# Patient Record
Sex: MALE | Race: WHITE
[De-identification: names, ages, dates, MRNs, and addresses within clinical notes are randomized per-mention and may not be internally consistent; named-entity substitution may affect disease eponyms.]

---

## 2018-01-18 ENCOUNTER — HOSPITAL ENCOUNTER (INPATIENT)
Dept: HOSPITAL 62 - ER | Age: 53
LOS: 2 days | Discharge: ANOTHER HOSPITAL | DRG: 208 | End: 2018-01-20
Attending: SURGERY | Admitting: SURGERY
Payer: COMMERCIAL

## 2018-01-18 DIAGNOSIS — J18.9: ICD-10-CM

## 2018-01-18 DIAGNOSIS — Z78.1: ICD-10-CM

## 2018-01-18 DIAGNOSIS — E78.5: ICD-10-CM

## 2018-01-18 DIAGNOSIS — S22.41XA: Primary | ICD-10-CM
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
## 2018-01-18 DIAGNOSIS — F32.9: ICD-10-CM

## 2018-01-18 DIAGNOSIS — F17.210: ICD-10-CM

## 2018-01-18 DIAGNOSIS — I10: ICD-10-CM

## 2018-01-18 DIAGNOSIS — J96.01: ICD-10-CM

## 2018-01-18 DIAGNOSIS — R09.02: ICD-10-CM

## 2018-01-18 DIAGNOSIS — J98.11: ICD-10-CM

## 2018-01-18 DIAGNOSIS — Z88.8: ICD-10-CM

## 2018-01-18 DIAGNOSIS — M25.521: ICD-10-CM

## 2018-01-18 DIAGNOSIS — J43.9: ICD-10-CM
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
## 2018-01-18 LAB
ABSOLUTE LYMPHOCYTES# (MANUAL): 1.1 10^3/UL (ref 0.5–4.7)
ABSOLUTE MONOCYTES # (MANUAL): 1.7 10^3/UL (ref 0.1–1.4)
ABSOLUTE NEUTROPHILS# (MANUAL): 18.6 10^3/UL (ref 1.7–8.2)
ADD MANUAL DIFF: YES
ALBUMIN SERPL-MCNC: 4.7 G/DL (ref 3.5–5)
ALP SERPL-CCNC: 108 U/L (ref 38–126)
ALT SERPL-CCNC: 49 U/L (ref 21–72)
ANION GAP SERPL CALC-SCNC: 14 MMOL/L (ref 5–19)
AST SERPL-CCNC: 46 U/L (ref 17–59)
BASOPHILS NFR BLD MANUAL: 0 % (ref 0–2)
BILIRUB DIRECT SERPL-MCNC: 0.2 MG/DL (ref 0–0.4)
BILIRUB SERPL-MCNC: 0.4 MG/DL (ref 0.2–1.3)
BUN SERPL-MCNC: 23 MG/DL (ref 7–20)
CALCIUM: 9.9 MG/DL (ref 8.4–10.2)
CHLORIDE SERPL-SCNC: 105 MMOL/L (ref 98–107)
CO2 SERPL-SCNC: 23 MMOL/L (ref 22–30)
EOSINOPHIL NFR BLD MANUAL: 0 % (ref 0–6)
ERYTHROCYTE [DISTWIDTH] IN BLOOD BY AUTOMATED COUNT: 13.1 % (ref 11.5–14)
GLUCOSE SERPL-MCNC: 128 MG/DL (ref 75–110)
HCT VFR BLD CALC: 53.5 % (ref 37.9–51)
HGB BLD-MCNC: 18.2 G/DL (ref 13.5–17)
MCH RBC QN AUTO: 33.5 PG (ref 27–33.4)
MCHC RBC AUTO-ENTMCNC: 34 G/DL (ref 32–36)
MCV RBC AUTO: 99 FL (ref 80–97)
MONOCYTES % (MANUAL): 8 % (ref 3–13)
PLATELET # BLD: 280 10^3/UL (ref 150–450)
PLATELET COMMENT: ADEQUATE
PLATELET GIANT: PRESENT
PLATELET LARGE: PRESENT
POTASSIUM SERPL-SCNC: 4.3 MMOL/L (ref 3.6–5)
PROT SERPL-MCNC: 7.8 G/DL (ref 6.3–8.2)
RBC # BLD AUTO: 5.44 10^6/UL (ref 4.35–5.55)
RBC MORPH BLD: (no result)
SEGMENTED NEUTROPHILS % (MAN): 87 % (ref 42–78)
SODIUM SERPL-SCNC: 142.1 MMOL/L (ref 137–145)
TOTAL CELLS COUNTED BLD: 100
TOXIC GRANULES BLD QL SMEAR: (no result)
VARIANT LYMPHS NFR BLD MANUAL: 4 % (ref 13–45)
WBC # BLD AUTO: 21.4 10^3/UL (ref 4–10.5)

## 2018-01-18 PROCEDURE — 93010 ELECTROCARDIOGRAM REPORT: CPT

## 2018-01-18 PROCEDURE — 71045 X-RAY EXAM CHEST 1 VIEW: CPT

## 2018-01-18 PROCEDURE — 94660 CPAP INITIATION&MGMT: CPT

## 2018-01-18 PROCEDURE — 31500 INSERT EMERGENCY AIRWAY: CPT

## 2018-01-18 PROCEDURE — 82803 BLOOD GASES ANY COMBINATION: CPT

## 2018-01-18 PROCEDURE — 96375 TX/PRO/DX INJ NEW DRUG ADDON: CPT

## 2018-01-18 PROCEDURE — 84478 ASSAY OF TRIGLYCERIDES: CPT

## 2018-01-18 PROCEDURE — 71260 CT THORAX DX C+: CPT

## 2018-01-18 PROCEDURE — 85027 COMPLETE CBC AUTOMATED: CPT

## 2018-01-18 PROCEDURE — 36600 WITHDRAWAL OF ARTERIAL BLOOD: CPT

## 2018-01-18 PROCEDURE — 85025 COMPLETE CBC W/AUTO DIFF WBC: CPT

## 2018-01-18 PROCEDURE — 87040 BLOOD CULTURE FOR BACTERIA: CPT

## 2018-01-18 PROCEDURE — 74181 MRI ABDOMEN W/O CONTRAST: CPT

## 2018-01-18 PROCEDURE — 36415 COLL VENOUS BLD VENIPUNCTURE: CPT

## 2018-01-18 PROCEDURE — 80053 COMPREHEN METABOLIC PANEL: CPT

## 2018-01-18 PROCEDURE — 96374 THER/PROPH/DIAG INJ IV PUSH: CPT

## 2018-01-18 PROCEDURE — 80048 BASIC METABOLIC PNL TOTAL CA: CPT

## 2018-01-18 PROCEDURE — 99285 EMERGENCY DEPT VISIT HI MDM: CPT

## 2018-01-18 PROCEDURE — 94002 VENT MGMT INPAT INIT DAY: CPT

## 2018-01-18 PROCEDURE — 93005 ELECTROCARDIOGRAM TRACING: CPT

## 2018-01-18 PROCEDURE — 83880 ASSAY OF NATRIURETIC PEPTIDE: CPT

## 2018-01-18 NOTE — ER DOCUMENT REPORT
ED Medical Screen (RME)





- General


Chief Complaint: Back Pain


Stated Complaint: RIGHT SIDE PAIN


Time Seen by Provider: 01/18/18 19:49


Mode of Arrival: Medic


Information source: Patient


Notes: 





52-year-old male presents after mechanical fall on ice landing on his back 

hitting his right posterior ribs.  Patient denies any shortness of breath notes 

pain with movement








I have greeted and performed a rapid initial assessment of this patient.  A 

comprehensive ED assessment and evaluation of the patient, analysis of test 

results and completion of the medical decision making process will be conducted 

by additional ED providers.





PHYSICAL EXAMINATION:





GENERAL: Well-appearing, well-nourished and in no acute distress.





HEAD: Atraumatic, normocephalic.





EYES: Pupils equal round extraocular movements intact,  conjunctiva are normal.





ENT: Nares patent





NECK: Normal range of motion





LUNGS: No respiratory distress





Musculoskeletal: Normal range of motion





NEUROLOGICAL:  Normal speech, normal gait. 





PSYCH: Normal mood, normal affect.





SKIN: Warm, Dry, normal turgor, no rashes or lesions noted.


TRAVEL OUTSIDE OF THE [place] IN LAST 30 DAYS: No





- Related Data


Allergies/Adverse Reactions: 


 





chlorpheniramine [From Actifed Cold-Allergy] Allergy (Verified 01/18/18 19:43)


 


phenylephrine [From Actifed Cold-Allergy] Allergy (Verified 01/18/18 19:43)


 


pseudoephedrine [From Actifed Cold-Allergy] Allergy (Verified 01/18/18 19:43)


 


triprolidine [From Actifed Cold-Allergy] Allergy (Verified 01/18/18 19:43)


 











Past Medical History





- Social History


Chew tobacco use (# tins/day): No


Frequency of alcohol use: Social


Drug Abuse: None





- Past Medical History


Cardiac Medical History: Reports: Hx Hypercholesterolemia, Hx Hypertension


Pulmonary Medical History: Reports: Hx COPD


Renal/ Medical History: Denies: Hx Peritoneal Dialysis


Psychiatric Medical History: Reports: Hx Depression





Physical Exam





- Vital signs


Vitals: 





 











Temp Pulse Resp BP Pulse Ox


 


 98.2 F   89   22 H  136/83 H  92 


 


 01/18/18 18:40  01/18/18 18:40  01/18/18 18:40  01/18/18 18:40  01/18/18 18:40














Course





- Vital Signs


Vital signs: 





 











Temp Pulse Resp BP Pulse Ox


 


 98.2 F   89   22 H  136/83 H  92 


 


 01/18/18 18:40  01/18/18 18:40  01/18/18 18:40  01/18/18 18:40  01/18/18 18:40

## 2018-01-18 NOTE — RADIOLOGY REPORT (SQ)
EXAM DESCRIPTION:

CT CHEST WITH



CLINICAL HISTORY:

fall on ice, right sided post rib pain



COMPARISON:

None.



TECHNIQUE:

Axial CT images of the chest were acquired after the

administration of intravenous contrast. Coronal and sagittal

reconstructions were obtained.  



This exam was performed according to our departmental

dose-optimization program which includes use of Automated

Exposure Control, adjustment of the mA and/or kV according to

patient size and/or use of iterative reconstruction technique.





FINDINGS:



Neck base: Unremarkable.

Mediastinum: Unremarkable.

Lymph Nodes: No lymphadenopathy.



Heart and pericardium: Coronary artery calcifications.

Aorta: No aneurysm.

Pulmonary Artery: Unremarkable.



Central Airways: Patent.

Pleura: No pleural effusion.

Lungs: Large left upper lobe bulla favored over pneumothorax.

Severe emphysema.

Right lower lobe partial consolidation. Left lower lobe

atelectasis.

No suspicious pulmonary nodule or mass.



Upper abdomen: Subcentimeter hypoattenuating lesion within the

left hepatic lobe, likely a small cyst or hemangioma. 15 mm right

adrenal nodule measuring 24 Hounsfield units. Nodular thickening

of the left adrenal.

Bones and soft tissues: Mildly displaced fractures of the right

eighth, ninth, and 10th ribs. Nondisplaced fracture of the right

11th rib.





IMPRESSION:



Right eighth, ninth, 10th, and 11th rib fractures.



Prominent left upper lobe bullous emphysema. Cystic spaces in the

left upper thorax are favored to represent bulla over

pneumothorax given lack of left-sided rib fractures.



Bilateral lower lobe consolidation, favored to represent

atelectasis over pneumonia given rib fractures.



Bilateral adrenal nodules. These likely represent adrenal

adenomas however may be confirmed with dedicated adrenal CT or

MRI.

## 2018-01-18 NOTE — ER DOCUMENT REPORT
ED General





- General


Chief Complaint: Back Pain


Stated Complaint: RIGHT SIDE PAIN


Time Seen by Provider: 01/18/18 19:49


Mode of Arrival: Medic


Notes: 





Patient is a 52 year old male with a past medical history of hypertension and 

COPD, active smoker, who presents after slipping on ice landing onto his right 

side.  He states that since that time he has had a severe, constant, aching 

pain to his right low flank.  He states it is worsened by movement and 

breathing.  He has received morphine and states that it did not provide any 

meaningful relief of his pain.  He has no history of similar injury in the 

past.  He does not have a baseline oxygen dependence.  He denies any additional 

injury to any other location.  He has not seen his primary care doctor 

regarding today's concerns.


TRAVEL OUTSIDE OF THE [place] IN LAST 30 DAYS: No





- Related Data


Allergies/Adverse Reactions: 


 





chlorpheniramine [From Actifed Cold-Allergy] Allergy (Verified 01/18/18 19:43)


 


phenylephrine [From Actifed Cold-Allergy] Allergy (Verified 01/18/18 19:43)


 


pseudoephedrine [From Actifed Cold-Allergy] Allergy (Verified 01/18/18 19:43)


 


triprolidine [From Actifed Cold-Allergy] Allergy (Verified 01/18/18 19:43)


 











Past Medical History





- General


Information source: Patient





- Social History


Smoking Status: Current Every Day Smoker


Chew tobacco use (# tins/day): No


Frequency of alcohol use: Social


Drug Abuse: None


Lives with: Spouse/Significant other


Family History: Reviewed & Not Pertinent


Patient has suicidal ideation: No


Patient has homicidal ideation: No





- Past Medical History


Cardiac Medical History: Reports: Hx Hypercholesterolemia, Hx Hypertension


Pulmonary Medical History: Reports: Hx COPD


Renal/ Medical History: Denies: Hx Peritoneal Dialysis


Psychiatric Medical History: Reports: Hx Depression





Review of Systems





- Review of Systems


Notes: 





Constitutional: Negative for fever.


Eyes: Negative for visual changes.


ENT: Negative for facial injury


Cardiovascular: Negative for chest injury.


Respiratory: Negative for shortness of breath.


Gastrointestinal: Negative for abdominal  injury.


Genitourinary: Negative for genital injury


Musculoskeletal: Postive for Right lower rib pain


Skin: Negative for laceration/abrasions.


Neurological: Negative for head injury.





Physical Exam





- Vital signs


Vitals: 


 











Temp Pulse Resp BP Pulse Ox


 


 98.2 F   89   22 H  136/83 H  92 


 


 01/18/18 18:40  01/18/18 18:40  01/18/18 18:40  01/18/18 18:40  01/18/18 18:40











Interpretation: Hypoxic, Tachypneic


Notes: 





PHYSICAL EXAMINATION:





GENERAL: Appears to be in significant pain.





HEAD: Atraumatic, normocephalic.





EYES: Pupils equal round and reactive to light, extraocular movements intact, 

sclera anicteric, conjunctiva are normal.





ENT: nares patent, no oral pharyngeal trauma.  No hemotympanum, no Sams's sign

, no raccoon eyes.





NECK: No midline cervical spine tenderness.  Patient able to move their head to 

45 bilaterally without any discomfort. 





LUNGS: Breath sounds clear to auscultation bilaterally and equal.  No wheezes 

rales or rhonchi.





HEART: Regular rate and rhythm without murmurs.





CHEST WALL: No ecchymosis over the chest wall.  Severe pain on palpation of the 

right lower posterior ribs.





ABDOMEN: Soft, nontender, normoactive bowel sounds.  No guarding, no rebound.  

No abdominal bruising





EXTREMITIES: Normal range of motion, no pitting or edema.  No long bone 

deformities.





BACK: No midline spinal tenderness, step-offs, or deformities.





NEUROLOGICAL: Face symmetric.  Tongue protrudes midline.  Extraocular motions 

intact.  Pupils are 2 mm and equally reactive.  Normal speech, normal gait.  5 

out of 5 strength in both the distal and proximal upper and lower extremities 

bilaterally.  Sensation is grossly intact throughout.  Finger to nose testing 

normal.  Pronator drift normal.





PSYCH: Normal mood, normal affect.





SKIN: Warm, Dry, normal turgor, no rashes or lesions noted.





Course





- Re-evaluation


Re-evalutation: 





01/18/18 21:54


Rib pain Patient presents after falling onto their ribs, complaining of focal 

pain to the affected area.  Patient appears to be in extreme pain, and had mild 

hypoxia at time of presentation at 92% on room air.  He is a smoker.  Patient 

denies any additional injuries.  He has focal pain to the ninth through 12th 

posterior lower ribs.  Given his degree of pain hypoxemia, will proceed with CT 

of the chest to clarify.  Will provide aggressive pain control and see if I can 

allow the patient to breathe in an acceptable way but anticipate he may require 

hospitalization


01/18/18 23:59


Patient continues to saturate 87-88% on room air.  He has a multitude rib 

fractures on the right side.  The CT is notable for extensive blebs in the left 

lung in particular but no evidence of a pneumothorax.  Given patient's 

hypoxemia and ongoing pain control issues he will require hospitalization.  I 

have increased the dosing of hydromorphone to try to improve his pain control.  

Will discuss with the surgeon on-call for trauma admission.


01/19/18 01:17


I have discussed with Dr. Rafael Sutherland who has agreed to hospitalize the 

patient given his oxygen dependence and injuries.





- Vital Signs


Vital signs: 


 











Temp Pulse Resp BP Pulse Ox


 


 98.2 F   78   16   115/74   91 L


 


 01/18/18 18:40  01/18/18 22:11  01/18/18 22:49  01/18/18 22:11  01/18/18 22:49














- Laboratory


Result Diagrams: 


 01/18/18 20:29





 01/18/18 21:27


Laboratory results interpreted by me: 


 











  01/18/18 01/18/18





  20:29 21:27


 


WBC  21.4 H 


 


Hgb  18.2 H 


 


Hct  53.5 H 


 


MCV  99 H 


 


MCH  33.5 H 


 


Seg Neuts % (Manual)  87 H 


 


Lymphocytes % (Manual)  4 L 


 


Abs Neuts (Manual)  18.6 H 


 


Abs Monocytes (Manual)  1.7 H 


 


BUN   23 H


 


Glucose   128 H














- Diagnostic Test


Radiology reviewed: Image reviewed, Reports reviewed


Radiology results interpreted by me: 





01/19/18 00:00


CT chest: Multiple rib fractures on the right side, extensive blebs on the left 

lung





Discharge





- Discharge


Clinical Impression: 


 Multiple fractures of ribs, right side, initial encounter for closed fracture, 

Hypoxemia





Fall


Qualifiers:


 Encounter type: initial encounter Qualified Code(s): W19.XXXA - Unspecified 

fall, initial encounter





Condition: Fair


Disposition: ADMITTED AS INPATIENT


Admitting Provider: Surgicalist - [facility]


Unit Admitted: Surgical Floor

## 2018-01-19 RX ADMIN — HYDROMORPHONE HYDROCHLORIDE PRN MG: 2 INJECTION INTRAMUSCULAR; INTRAVENOUS; SUBCUTANEOUS at 20:06

## 2018-01-19 RX ADMIN — HYDROMORPHONE HYDROCHLORIDE PRN MG: 2 INJECTION INTRAMUSCULAR; INTRAVENOUS; SUBCUTANEOUS at 08:04

## 2018-01-19 RX ADMIN — ENOXAPARIN SODIUM SCH MG: 40 INJECTION SUBCUTANEOUS at 11:19

## 2018-01-19 RX ADMIN — HYDROMORPHONE HYDROCHLORIDE PRN MG: 2 INJECTION INTRAMUSCULAR; INTRAVENOUS; SUBCUTANEOUS at 14:24

## 2018-01-19 RX ADMIN — Medication SCH ML: at 13:09

## 2018-01-19 RX ADMIN — Medication SCH ML: at 21:38

## 2018-01-19 RX ADMIN — HYDROMORPHONE HYDROCHLORIDE PRN MG: 2 INJECTION INTRAMUSCULAR; INTRAVENOUS; SUBCUTANEOUS at 17:15

## 2018-01-19 RX ADMIN — FAMOTIDINE SCH MG: 20 TABLET, FILM COATED ORAL at 21:37

## 2018-01-19 RX ADMIN — HYDROMORPHONE HYDROCHLORIDE PRN MG: 2 INJECTION INTRAMUSCULAR; INTRAVENOUS; SUBCUTANEOUS at 12:03

## 2018-01-19 RX ADMIN — LIDOCAINE SCH PATCH: 50 PATCH CUTANEOUS at 11:18

## 2018-01-19 RX ADMIN — HYDROMORPHONE HYDROCHLORIDE PRN MG: 2 INJECTION INTRAMUSCULAR; INTRAVENOUS; SUBCUTANEOUS at 23:19

## 2018-01-19 RX ADMIN — Medication SCH ML: at 10:53

## 2018-01-19 RX ADMIN — FAMOTIDINE SCH MG: 20 TABLET, FILM COATED ORAL at 11:17

## 2018-01-19 RX ADMIN — HYDROMORPHONE HYDROCHLORIDE PRN MG: 2 INJECTION INTRAMUSCULAR; INTRAVENOUS; SUBCUTANEOUS at 03:35

## 2018-01-19 RX ADMIN — PIPERACILLIN AND TAZOBACTAM SCH GM: 3; .375 INJECTION, POWDER, LYOPHILIZED, FOR SOLUTION INTRAVENOUS; PARENTERAL at 09:25

## 2018-01-19 RX ADMIN — FLUOXETINE SCH MG: 20 CAPSULE ORAL at 21:37

## 2018-01-19 RX ADMIN — PIPERACILLIN AND TAZOBACTAM SCH GM: 3; .375 INJECTION, POWDER, LYOPHILIZED, FOR SOLUTION INTRAVENOUS; PARENTERAL at 20:07

## 2018-01-19 RX ADMIN — BUDESONIDE AND FORMOTEROL FUMARATE DIHYDRATE SCH PUFF: 80; 4.5 AEROSOL RESPIRATORY (INHALATION) at 23:18

## 2018-01-19 RX ADMIN — PIPERACILLIN AND TAZOBACTAM SCH GM: 3; .375 INJECTION, POWDER, LYOPHILIZED, FOR SOLUTION INTRAVENOUS; PARENTERAL at 15:02

## 2018-01-19 NOTE — RADIOLOGY REPORT (SQ)
EXAM DESCRIPTION:  MRI ABDOMEN WITHOUT



COMPLETED DATE/TIME:  1/19/2018 9:07 am



REASON FOR STUDY:  Adrenal masses



COMPARISON:  CT chest 1/18/2018



TECHNIQUE:  Noncontrast imaging with attention to the adrenal glands, including in and out of phase T
1 sequences.



LIMITATIONS:  None.



FINDINGS:  ADRENAL GLANDS: On the right side, a 1.5 cm adenoma is present.  On the left side, a 2.5 x
 2 cm adenoma is present.  These findings correlate with the CT 1/18/2018.  No further specific imagi
ng follow-up imaging is required

GALLBLADDER: No masses. No stones. No gallbladder wall thickening or pericholecystic fluid.

LIVER AND BILIARY STRUCTURES: Normal. No ductal dilatation.

SPLEEN: Normal.

PANCREAS: Normal. Peripancreatic tissues normal.

PERITONEUM: No ascites, gross adenopathy or implants.

OTHER: Kidneys are unremarkable.  No upper abdominal free fluid.

There are right lower posterolateral rib fractures with a small amount of adjacent chest wall fluid. 
 No pleural effusion.  There is dense consolidation in the right lower lobe and patchy consolidation 
at the left base, atelectasis versus pneumonia.



IMPRESSION:  Bilateral adrenal adenomas

Right lower posterior rib fractures.  No right kidney or liver laceration

Dense consolidation right lower lobe, patchy consolidation left base, atelectasis versus pneumonia



TECHNICAL DOCUMENTATION:  JOB ID: [id number]

 2011 [employer]- All Rights Reserved

## 2018-01-19 NOTE — PDOC H&P
History of Present Illness


Patient complains of: Right-sided posterior chest pain


History of Present Illness: 


LUDWIN SWENSON is a 52 year old male status post fall on the ice landing on 

his right chest with associated pain.  Patient was noted with decreased oxygen 

saturations requiring supplemental oxygen.  Patient denies any other sites of 

pain other than very mild soreness of the right elbow.  He denies any loss of 

consciousness.  He does not hurt in his hip.  No abdominal pain.  Patient does 

drink almost every day and has had some alcohol today.








Past Medical History


Cardiac Medical History: Reports: Hyperlipidema, Hypertension


Pulmonary Medical History: Reports: Chronic Obstructive Pulmonary Disease (COPD)


Psychiatric Medical History: Reports: Depression





Past Surgical History


Past Surgical History: Reports: Other - Left orchiectomy in the remote past.





Social History


Lives with: Spouse/Significant other


Smoking Status: Current Every Day Smoker





Family History


Family History: Reviewed & Not Pertinent


Parental Family History Reviewed: No


Children Family History Reviewed: No


Sibling(s) Family History Reviewed.: No





Medication/Allergy


Allergies/Adverse Reactions: 


 





chlorpheniramine [From Actifed Cold-Allergy] Allergy (Verified 01/18/18 19:43)


 


phenylephrine [From Actifed Cold-Allergy] Allergy (Verified 01/18/18 19:43)


 


pseudoephedrine [From Actifed Cold-Allergy] Allergy (Verified 01/18/18 19:43)


 


triprolidine [From Actifed Cold-Allergy] Allergy (Verified 01/18/18 19:43)


 











Physical Exam


Vital Signs: 


 











Temp Pulse Resp BP Pulse Ox


 


 98.2 F   78   16   115/74   91 L


 


 01/18/18 18:40  01/18/18 22:11  01/18/18 22:49  01/18/18 22:11  01/18/18 22:49








 Intake & Output











 01/17/18 01/18/18 01/19/18





 06:59 06:59 06:59


 


Weight   103.1 kg











General appearance: PRESENT: no acute distress, cooperative


Eye exam: PRESENT: conjunctiva pink


Neck exam: PRESENT: other - Supple with no vertebral tenderness.


Respiratory exam: PRESENT: decreased breath sounds - Tenderness along the right 

posterior chest but no crepitus and no bruising.


Cardiovascular exam: PRESENT: RRR


Pulses: PRESENT: normal radial pulses


GI/Abdominal exam: PRESENT: other - Soft, nondistended, nontender to palpation.


Extremities exam: PRESENT: other - Full range of motion without pain with range 

of motion.  Very minimal right elbow tenderness with no swelling and no 

bruising.  Right arm neurovascularly intact distally.


Neurological exam: PRESENT: alert, awake


Psychiatric exam: PRESENT: appropriate affect


Skin exam: PRESENT: warm





Results


Laboratory Results: 


 





 01/18/18 20:29 





 01/18/18 21:27 





 











  01/18/18 01/18/18 01/18/18





  20:29 20:29 21:27


 


WBC  21.4 H  


 


RBC  5.44  


 


Hgb  18.2 H  


 


Hct  53.5 H  


 


MCV  99 H  


 


MCH  33.5 H  


 


MCHC  34.0  


 


RDW  13.1  


 


Plt Count  280  


 


Seg Neutrophils %  Not Reportable  


 


Lymphocytes %  Not Reportable  


 


Monocytes %  Not Reportable  


 


Eosinophils %  Not Reportable  


 


Basophils %  Not Reportable  


 


Absolute Neutrophils  Not Reportable  


 


Absolute Lymphocytes  Not Reportable  


 


Absolute Monocytes  Not Reportable  


 


Absolute Eosinophils  Not Reportable  


 


Absolute Basophils  Not Reportable  


 


Sodium   Cancelled  142.1


 


Potassium   Cancelled  4.3


 


Chloride   Cancelled  105


 


Carbon Dioxide   Cancelled  23


 


Anion Gap   Cancelled  14


 


BUN   Cancelled  23 H


 


Creatinine   Cancelled  1.02


 


Est GFR ( Amer)   Cancelled  > 60


 


Est GFR (Non-Af Amer)   Cancelled  > 60


 


Glucose   Cancelled  128 H


 


Calcium   Cancelled  9.9


 


Total Bilirubin   Cancelled  0.4


 


AST   Cancelled  46


 


ALT   Cancelled  49


 


Alkaline Phosphatase   Cancelled  108


 


Total Protein   Cancelled  7.8


 


Albumin   Cancelled  4.7











Impressions: 


 





Chest CT  01/18/18 19:53


IMPRESSION:


 


Right eighth, ninth, 10th, and 11th rib fractures.


 


Prominent left upper lobe bullous emphysema. Cystic spaces in the


left upper thorax are favored to represent bulla over


pneumothorax given lack of left-sided rib fractures.


 


Bilateral lower lobe consolidation, favored to represent


atelectasis over pneumonia given rib fractures.


 


Bilateral adrenal nodules. These likely represent adrenal


adenomas however may be confirmed with dedicated adrenal CT or


MRI.


 


 














Assessment & Plan





- Diagnosis


(1) Multiple fractures of ribs, right side, initial encounter for closed 

fracture


Is this a current diagnosis for this admission?: Yes   


Plan: 


Status post fall.  Multiple rib fractures with atelectasis.  Will admit for 

pain control and pulmonary toilet.  Will consult hospitalist in the morning to 

assist us with his pulmonary management and also for possible alcohol 

withdrawal down the road.








(2) Adrenal mass


Is this a current diagnosis for this admission?: Yes   


Plan: 


Incidental finding noted on CT scan.  Will obtain a MRI while he is in the 

hospital to evaluate these masses.

## 2018-01-20 VITALS — SYSTOLIC BLOOD PRESSURE: 103 MMHG | DIASTOLIC BLOOD PRESSURE: 64 MMHG

## 2018-01-20 LAB
ANION GAP SERPL CALC-SCNC: 15 MMOL/L (ref 5–19)
ARTERIAL BLOOD FIO2: (no result)
ARTERIAL BLOOD FIO2: 100
ARTERIAL BLOOD H2CO3: 1.31 MMOL/L (ref 1.05–1.35)
ARTERIAL BLOOD H2CO3: 1.34 MMOL/L (ref 1.05–1.35)
ARTERIAL BLOOD H2CO3: 1.39 MMOL/L (ref 1.05–1.35)
ARTERIAL BLOOD H2CO3: 1.97 MMOL/L (ref 1.05–1.35)
ARTERIAL BLOOD HCO3: 23.4 MMOL/L (ref 20–26)
ARTERIAL BLOOD HCO3: 26.2 MMOL/L (ref 20–26)
ARTERIAL BLOOD HCO3: 26.8 MMOL/L (ref 20–26)
ARTERIAL BLOOD HCO3: 27.1 MMOL/L (ref 20–26)
ARTERIAL BLOOD PCO2: 43.5 MMHG (ref 35–45)
ARTERIAL BLOOD PCO2: 44.5 MMHG (ref 35–45)
ARTERIAL BLOOD PCO2: 46.1 MMHG (ref 35–45)
ARTERIAL BLOOD PCO2: 65.5 MMHG (ref 35–45)
ARTERIAL BLOOD PH: 7.24 (ref 7.35–7.45)
ARTERIAL BLOOD PH: 7.35 (ref 7.35–7.45)
ARTERIAL BLOOD PH: 7.37 (ref 7.35–7.45)
ARTERIAL BLOOD PH: 7.4 (ref 7.35–7.45)
ARTERIAL BLOOD PO2: 55.3 MMHG (ref 80–100)
ARTERIAL BLOOD PO2: 56.8 MMHG (ref 80–100)
ARTERIAL BLOOD PO2: 61.1 MMHG (ref 80–100)
ARTERIAL BLOOD PO2: 63 MMHG (ref 80–100)
ARTERIAL BLOOD TOTAL CO2: 24.7 MMOL/L (ref 23–27)
ARTERIAL BLOOD TOTAL CO2: 27.6 MMOL/L (ref 23–27)
ARTERIAL BLOOD TOTAL CO2: 28.2 MMOL/L (ref 23–27)
ARTERIAL BLOOD TOTAL CO2: 29.1 MMOL/L (ref 23–27)
BASE EXCESS BLDA CALC-SCNC: -2.2 MMOL/L
BASE EXCESS BLDA CALC-SCNC: -2.4 MMOL/L
BASE EXCESS BLDA CALC-SCNC: 0.4 MMOL/L
BASE EXCESS BLDA CALC-SCNC: 1.5 MMOL/L
BUN SERPL-MCNC: 47 MG/DL (ref 7–20)
CALCIUM: 10 MG/DL (ref 8.4–10.2)
CHLORIDE SERPL-SCNC: 102 MMOL/L (ref 98–107)
CO2 SERPL-SCNC: 24 MMOL/L (ref 22–30)
ERYTHROCYTE [DISTWIDTH] IN BLOOD BY AUTOMATED COUNT: 13.1 % (ref 11.5–14)
GLUCOSE SERPL-MCNC: 111 MG/DL (ref 75–110)
HCT VFR BLD CALC: 49.5 % (ref 37.9–51)
HGB BLD-MCNC: 16.9 G/DL (ref 13.5–17)
MCH RBC QN AUTO: 33.9 PG (ref 27–33.4)
MCHC RBC AUTO-ENTMCNC: 34.2 G/DL (ref 32–36)
MCV RBC AUTO: 99 FL (ref 80–97)
PLATELET # BLD: 230 10^3/UL (ref 150–450)
POTASSIUM SERPL-SCNC: 4.3 MMOL/L (ref 3.6–5)
RBC # BLD AUTO: 4.99 10^6/UL (ref 4.35–5.55)
SAO2 % BLDA: 87.4 % (ref 94–98)
SAO2 % BLDA: 87.8 % (ref 94–98)
SAO2 % BLDA: 89.4 % (ref 94–98)
SAO2 % BLDA: 90.2 % (ref 94–98)
SODIUM SERPL-SCNC: 141 MMOL/L (ref 137–145)
WBC # BLD AUTO: 19.6 10^3/UL (ref 4–10.5)

## 2018-01-20 PROCEDURE — 0BH17EZ INSERTION OF ENDOTRACHEAL AIRWAY INTO TRACHEA, VIA NATURAL OR ARTIFICIAL OPENING: ICD-10-PCS | Performed by: INTERNAL MEDICINE

## 2018-01-20 PROCEDURE — 5A1935Z RESPIRATORY VENTILATION, LESS THAN 24 CONSECUTIVE HOURS: ICD-10-PCS | Performed by: INTERNAL MEDICINE

## 2018-01-20 RX ADMIN — LIDOCAINE SCH PATCH: 50 PATCH CUTANEOUS at 09:44

## 2018-01-20 RX ADMIN — PIPERACILLIN AND TAZOBACTAM SCH GM: 3; .375 INJECTION, POWDER, LYOPHILIZED, FOR SOLUTION INTRAVENOUS; PARENTERAL at 08:20

## 2018-01-20 RX ADMIN — ENOXAPARIN SODIUM SCH MG: 40 INJECTION SUBCUTANEOUS at 16:29

## 2018-01-20 RX ADMIN — Medication SCH ML: at 16:29

## 2018-01-20 RX ADMIN — PIPERACILLIN AND TAZOBACTAM SCH GM: 3; .375 INJECTION, POWDER, LYOPHILIZED, FOR SOLUTION INTRAVENOUS; PARENTERAL at 19:05

## 2018-01-20 RX ADMIN — LEVALBUTEROL HYDROCHLORIDE SCH MG: 1.25 SOLUTION RESPIRATORY (INHALATION) at 23:24

## 2018-01-20 RX ADMIN — LEVALBUTEROL HYDROCHLORIDE SCH MG: 1.25 SOLUTION RESPIRATORY (INHALATION) at 19:34

## 2018-01-20 RX ADMIN — FLUOXETINE SCH MG: 20 CAPSULE ORAL at 09:46

## 2018-01-20 RX ADMIN — HYDROMORPHONE HYDROCHLORIDE PRN MG: 2 INJECTION INTRAMUSCULAR; INTRAVENOUS; SUBCUTANEOUS at 03:54

## 2018-01-20 RX ADMIN — FAMOTIDINE SCH MG: 20 TABLET, FILM COATED ORAL at 09:47

## 2018-01-20 RX ADMIN — HYDROMORPHONE HYDROCHLORIDE PRN MG: 2 INJECTION INTRAMUSCULAR; INTRAVENOUS; SUBCUTANEOUS at 11:35

## 2018-01-20 RX ADMIN — HYDROMORPHONE HYDROCHLORIDE PRN MG: 2 INJECTION INTRAMUSCULAR; INTRAVENOUS; SUBCUTANEOUS at 08:06

## 2018-01-20 RX ADMIN — LEVALBUTEROL HYDROCHLORIDE SCH MG: 1.25 SOLUTION RESPIRATORY (INHALATION) at 15:49

## 2018-01-20 RX ADMIN — Medication SCH ML: at 05:22

## 2018-01-20 RX ADMIN — BUDESONIDE AND FORMOTEROL FUMARATE DIHYDRATE SCH PUFF: 80; 4.5 AEROSOL RESPIRATORY (INHALATION) at 09:47

## 2018-01-20 RX ADMIN — PIPERACILLIN AND TAZOBACTAM SCH GM: 3; .375 INJECTION, POWDER, LYOPHILIZED, FOR SOLUTION INTRAVENOUS; PARENTERAL at 02:06

## 2018-01-20 RX ADMIN — PIPERACILLIN AND TAZOBACTAM SCH GM: 3; .375 INJECTION, POWDER, LYOPHILIZED, FOR SOLUTION INTRAVENOUS; PARENTERAL at 20:31

## 2018-01-20 NOTE — PDOC PROGRESS REPORT
Subjective


Progress Note for:: 01/20/18


Subjective:: 





c/o right back pain


Reason For Visit: 


RIGHT SIDED RIB FRACTURES, ATELECTASIS, COPD








Physical Exam


Vital Signs: 


 











Temp Pulse Resp BP Pulse Ox


 


 97.9 F   107 H  24 H  162/95 H  83 L


 


 01/20/18 07:38  01/20/18 07:38  01/20/18 07:38  01/20/18 07:38  01/20/18 07:38








 Intake & Output











 01/19/18 01/20/18 01/21/18





 06:59 06:59 06:59


 


Intake Total  360 


 


Output Total  325 


 


Balance  35 


 


Weight  107 kg 











Respiratory exam: PRESENT: chest wall tenderness - on posterior right, clear to 

auscultation judith


Cardiovascular exam: PRESENT: RRR


GI/Abdominal exam: PRESENT: soft





Results


Laboratory Results: 


 





 01/20/18 06:37 





 01/20/18 06:37 





 











  01/20/18 01/20/18





  06:37 06:37


 


WBC  19.6 H 


 


RBC  4.99 


 


Hgb  16.9 


 


Hct  49.5 


 


MCV  99 H 


 


MCH  33.9 H 


 


MCHC  34.2 


 


RDW  13.1 


 


Plt Count  230 


 


Sodium   141.0


 


Potassium   4.3


 


Chloride   102


 


Carbon Dioxide   24


 


Anion Gap   15


 


BUN   47 H


 


Creatinine   1.32 H


 


Est GFR ( Amer)   > 60


 


Est GFR (Non-Af Amer)   57 L


 


Glucose   111 H


 


Calcium   10.0











Impressions: 


 





Chest CT  01/18/18 19:53


IMPRESSION:


 


Right eighth, ninth, 10th, and 11th rib fractures.


 


Prominent left upper lobe bullous emphysema. Cystic spaces in the


left upper thorax are favored to represent bulla over


pneumothorax given lack of left-sided rib fractures.


 


Bilateral lower lobe consolidation, favored to represent


atelectasis over pneumonia given rib fractures.


 


Bilateral adrenal nodules. These likely represent adrenal


adenomas however may be confirmed with dedicated adrenal CT or


MRI.


 


 








Abdomen MRI  01/19/18 00:00


IMPRESSION:  Bilateral adrenal adenomas


Right lower posterior rib fractures.  No right kidney or liver laceration


Dense consolidation right lower lobe, patchy consolidation left base, 

atelectasis versus pneumonia


 








Chest X-Ray  01/20/18 00:00


IMPRESSION:  RIGHT GREATER THAN LEFT LOWER LOBE AIRSPACE DISEASE IN THE SETTING 

OF KNOWN RIB FRACTURES.  DIFFERENTIAL INCLUDES SUBSEGMENTAL ATELECTASIS, 

ASPIRATION, CONTUSION, OR PNEUMONIA.  NO PNEUMOTHORAX.


 














Assessment & Plan





- Diagnosis








(2) Multiple fractures of ribs, right side, initial encounter for closed 

fracture


Is this a current diagnosis for this admission?: Yes   





- Plan Summary


Plan Summary: 





Continue pain management for right posterior rib fx, improved


leukocytosis


right infiltrate, possible pneumonia





Consult Hospitalist for pneumonia antibiotic coverage

## 2018-01-20 NOTE — RADIOLOGY REPORT (SQ)
EXAM DESCRIPTION:  CHEST SINGLE VIEW



COMPLETED DATE/TIME:  1/20/2018 9:43 am



REASON FOR STUDY:  dyspnea, known rt rib fractures



COMPARISON:  CT chest from 1/18/2018



EXAM PARAMETERS:  NUMBER OF VIEWS: One view.

TECHNIQUE: Single frontal radiographic view of the chest acquired.

RADIATION DOSE: NA

LIMITATIONS: None.



FINDINGS:  LUNGS AND PLEURA: Manny greater than left lower lobe airspace disease.  No pneumothorax.  S
table underlying emphysema.

MEDIASTINUM AND HILAR STRUCTURES: No masses.  Contour normal.

HEART AND VASCULAR STRUCTURES: Heart normal in size.  Normal vasculature.

BONES: Again noted are several right-sided rib fractures.

HARDWARE: None in the chest.

OTHER: No other significant finding.



IMPRESSION:  RIGHT GREATER THAN LEFT LOWER LOBE AIRSPACE DISEASE IN THE SETTING OF KNOWN RIB FRACTURE
S.  DIFFERENTIAL INCLUDES SUBSEGMENTAL ATELECTASIS, ASPIRATION, CONTUSION, OR PNEUMONIA.  NO PNEUMOTH
ORAX.



TECHNICAL DOCUMENTATION:  JOB ID:  [id number]

 2011 [employer]- All Rights Reserved

## 2018-01-20 NOTE — EKG REPORT
SEVERITY:- ABNORMAL ECG -

SINUS RHYTHM

PROBABLE INFERIOR INFARCT, OLD

:

Confirmed by: Marlin Vasquez 20-Jan-2018 22:15:47

## 2018-01-20 NOTE — PDOC CONSULTATION
Consultation


Consult Date: 01/20/18


Attending physician:: DR Donald


Consult reason:: management pneumonia - hypoxemia





History of Present Illness


Admission Date/PCP: 


  01/19/18 01:40





  





History of Present Illness: 


LUDWIN SWENSON is a 52 year old male status post fall on the ice 


Patient stated that he slipped and injured the right chest


He presented to the ED and was diagnosed to have multiple rib fractures -eighth 

through 11-


And bilateral pneumonia


Patient was admitted to the telemetry unit; he developed profound hypoxemia and 

hospitalist consult was called.








Past Medical History


Cardiac Medical History: Reports: Hyperlipidema, Hypertension


Pulmonary Medical History: Reports: Chronic Obstructive Pulmonary Disease (COPD)


Psychiatric Medical History: Reports: Depression





Past Surgical History


Past Surgical History: Reports: Other - Left orchiectomy in the remote past.





Social History


Lives with: Spouse/Significant other


Smoking Status: Current Every Day Smoker





Family History


Family History: Reviewed & Not Pertinent


Parental Family History Reviewed: Yes


Children Family History Reviewed: Yes


Sibling(s) Family History Reviewed.: Yes





Medication/Allergy


Home Medications: 








Amlodipine Besylate [Norvasc 5 mg Tablet] 5 mg PO DAILY 01/19/18 


Aspirin [Aspirin EC] 81 mg PO DAILY 01/19/18 


Atenolol [Tenormin 50 mg Tablet] 50 mg PO DAILY 01/19/18 


Atorvastatin Calcium [Lipitor 10 mg Tablet] 10 mg PO QHS 01/19/18 


Budesonide/Formoterol Fumarate [Symbicort Hfa 80-4.5 Mcg Inhaler 6.9 gm] 2 puff 

IH Q12 01/19/18 


Fluoxetine HCl [Prozac] 20 mg PO Q12 01/19/18 


Multivitamin [Tab-A-Lupe (Multiple Vitamin) Tablet] 1 tab PO DAILY 01/19/18 








Allergies/Adverse Reactions: 


 





chlorpheniramine [From Actifed Cold-Allergy] Allergy (Verified 01/18/18 19:43)


 


phenylephrine [From Actifed Cold-Allergy] Allergy (Verified 01/18/18 19:43)


 


pseudoephedrine [From Actifed Cold-Allergy] Allergy (Verified 01/18/18 19:43)


 


triprolidine [From Actifed Cold-Allergy] Allergy (Verified 01/18/18 19:43)


 











Review of Systems


Constitutional: ABSENT: as per HPI, anorexia, chills, fatigue, fever(s), 

headache(s), night sweats, weakness, weight gain, weight loss, other


Respiratory: PRESENT: cough, dyspnea, other - Severe pleuritic chest pain


Gastrointestinal: ABSENT: as per HPI, abdominal pain, bloating, coffee ground 

emesis, constipation, diarrhea, dysphagia, heartburn, hematemesis, hematochezia

, melena, nausea, vomiting, other


Genitourinary: ABSENT: dysuria, hematuria


Integumentary: ABSENT: rash, wounds


Neurological: ABSENT: abnormal gait, abnormal speech, confusion, dizziness, 

focal weakness, syncope


Psychiatric: ABSENT: anxiety, depression, homidical ideation, suicidal ideation


Hematologic/Lymphatic: ABSENT: easy bleeding, easy bruising





Physical Exam


Vital Signs: 


 











Temp Pulse Resp BP Pulse Ox


 


 99.5 F   89   18   137/85 H  90 L


 


 01/20/18 15:33  01/20/18 15:33  01/20/18 15:33  01/20/18 15:33  01/20/18 16:00








 





Pulse Oximeter Continuous                                  Start:  01/20/18 12:

40


Freq:   RTQ4                                               Status: Active      

  


 Document     01/20/18 16:00  [facility]  (Rec: 01/20/18 16:25  [facility]  [email or web address])


 Pulse Oximetry Assessment


     Oxygen Saturation ()                  90


     Oxygen Delivery Method                      Bi-pap


     Fraction of Inspired Oxygen (FIO2)          100


     Equipment Usage                             Equipment in Use


     Continuous SpO2 Machine #                   [id number]





 Intake & Output











 01/19/18 01/20/18 01/21/18





 00:59 00:59 00:59


 


Intake Total  0 360


 


Output Total   325


 


Balance  0 35


 


Weight   107 kg











General appearance: PRESENT: severe distress, well-developed, well-nourished


Head exam: PRESENT: atraumatic, normocephalic


Eye exam: PRESENT: conjunctiva pink, EOMI, PERRLA.  ABSENT: scleral icterus


Ear exam: PRESENT: normal external ear exam


Mouth exam: PRESENT: moist, tongue midline


Neck exam: ABSENT: carotid bruit, JVD, lymphadenopathy, thyromegaly


Respiratory exam: PRESENT: decreased breath sounds, rhonchi, wheezes.  ABSENT: 

rales


Cardiovascular exam: PRESENT: RRR, tachycardia.  ABSENT: diastolic murmur, rubs

, systolic murmur


Pulses: PRESENT: normal dorsalis pedis pul


Vascular exam: PRESENT: normal capillary refill


GI/Abdominal exam: PRESENT: normal bowel sounds, soft.  ABSENT: distended, 

guarding, mass, organolmegaly, rebound, tenderness


Rectal exam: PRESENT: deferred


Extremities exam: PRESENT: full ROM.  ABSENT: calf tenderness, clubbing, pedal 

edema


Neurological exam: PRESENT: alert, awake, oriented to person, oriented to place

, oriented to time, oriented to situation, CN II-XII grossly intact.  ABSENT: 

motor sensory deficit


Psychiatric exam: PRESENT: appropriate affect, normal mood


Skin exam: PRESENT: dry, intact, warm.  ABSENT: cyanosis, rash





Results


Laboratory Results: 


 





 01/20/18 06:37 





 01/20/18 06:37 





 











  01/20/18 01/20/18 01/20/18





  06:37 06:37 10:40


 


WBC  19.6 H  


 


RBC  4.99  


 


Hgb  16.9  


 


Hct  49.5  


 


MCV  99 H  


 


MCH  33.9 H  


 


MCHC  34.2  


 


RDW  13.1  


 


Plt Count  230  


 


Carbonic Acid    1.31


 


HCO3/H2CO3 Ratio    17:1


 


ABG pH    7.35


 


ABG pCO2    43.5


 


ABG pO2    61.1 L


 


ABG HCO3    23.4


 


ABG O2 Saturation    90.2 L


 


ABG Base Excess    -2.4


 


FiO2    100%


 


Sodium   141.0 


 


Potassium   4.3 


 


Chloride   102 


 


Carbon Dioxide   24 


 


Anion Gap   15 


 


BUN   47 H 


 


Creatinine   1.32 H 


 


Est GFR ( Amer)   > 60 


 


Est GFR (Non-Af Amer)   57 L 


 


Glucose   111 H 


 


Calcium   10.0 














  01/20/18





  16:19


 


WBC 


 


RBC 


 


Hgb 


 


Hct 


 


MCV 


 


MCH 


 


MCHC 


 


RDW 


 


Plt Count 


 


Carbonic Acid  1.34


 


HCO3/H2CO3 Ratio  20:1


 


ABG pH  7.40


 


ABG pCO2  44.5


 


ABG pO2  56.8 L


 


ABG HCO3  26.8 H


 


ABG O2 Saturation  89.4 L


 


ABG Base Excess  1.5


 


FiO2  100%


 


Sodium 


 


Potassium 


 


Chloride 


 


Carbon Dioxide 


 


Anion Gap 


 


BUN 


 


Creatinine 


 


Est GFR ( Amer) 


 


Est GFR (Non-Af Amer) 


 


Glucose 


 


Calcium 











Impressions: 


 





Chest CT  01/18/18 19:53


IMPRESSION:


 


Right eighth, ninth, 10th, and 11th rib fractures.


 


Prominent left upper lobe bullous emphysema. Cystic spaces in the


left upper thorax are favored to represent bulla over


pneumothorax given lack of left-sided rib fractures.


 


Bilateral lower lobe consolidation, favored to represent


atelectasis over pneumonia given rib fractures.


 


Bilateral adrenal nodules. These likely represent adrenal


adenomas however may be confirmed with dedicated adrenal CT or


MRI.


 


 








Abdomen MRI  01/19/18 00:00


IMPRESSION:  Bilateral adrenal adenomas


Right lower posterior rib fractures.  No right kidney or liver laceration


Dense consolidation right lower lobe, patchy consolidation left base, 

atelectasis versus pneumonia


 








Chest X-Ray  01/20/18 00:00


IMPRESSION:  Stable radiographic appearance of the chest again demonstrating 

bibasilar airspace opacities.


 














Assessment & Plan





- Diagnosis


(1) Fall


Qualifiers: 


   Encounter type: initial encounter   Qualified Code(s): W19.XXXA - 

Unspecified fall, initial encounter   





(2) Multiple fractures of ribs, right side, initial encounter for closed 

fracture


Is this a current diagnosis for this admission?: Yes   





(3) Pneumonia


Qualifiers: 


   Aspiration pneumonia type: unspecified   Lung location: unspecified part of 

lung 


Is this a current diagnosis for this admission?: Yes   


Plan: 


We will treat with Zosyn and Levaquin








(4) Fracture, ribs


Is this a current diagnosis for this admission?: Yes   


Plan: 


Incentive spirometry ; management as per surgery











(5) COPD exacerbation


Is this a current diagnosis for this admission?: Yes   


Plan: 


Treat the patient with steroids and nebs








(6) Acute on chronic respiratory failure with hypoxemia


Is this a current diagnosis for this admission?: Yes   


Plan: 


Secondary to rib fractures, pneumonia, and COPD exacerbation


Patient is a candidate for high flow nasal cannula








- Time


Time Spent: 50 to 70 Minutes

## 2018-01-20 NOTE — PROGRESS NOTE
Provider Note


Provider Note: 





The patient became more hypoxic despite being on a nonrebreather.  He was put 

on BiPAP with pressure settings of 12 x 8 FiO2 of 100% but remained 

persistently hypoxic.


There were concerns with going up on the pressure on BiPAP given the numerous 

blebs in his left lung and the high risk of pneumothorax.





I transferred the patient to the intensive care unit for intubation.


This is happening as I dictate the note.


The ER edition Dr. Robson Shukla, the surgeon Dr. Breen's alert no and the 

anesthetist on call or all at the patient's bedside.


I am in the process of requesting transfer to a tertiary care center given the 

high risk vent management in this patient with severe underlying emphysema.





I spoke to the transfer center at [facility] and also at [facility].


Was told that there intensive care units are at full capacity at present but 

that they will put me in touch with their intensivist on call.





I also spoke to the patient's sister and gave her an update of the patient's 

condition and plan of care.

## 2018-01-20 NOTE — RADIOLOGY REPORT (SQ)
EXAM DESCRIPTION:  CHEST SINGLE VIEW



COMPLETED DATE/TIME:  1/20/2018 6:14 pm



REASON FOR STUDY:  ET tube Placement



COMPARISON:  1/20/2018



EXAM PARAMETERS:  NUMBER OF VIEWS: One view.

TECHNIQUE: Single frontal radiographic view of the chest acquired.

RADIATION DOSE: NA

LIMITATIONS: None.



FINDINGS:  LUNGS AND PLEURA: Bibasilar opacities.  No pneumothorax.  No large pleural effusion eviden
t.  Background of emphysematous changes.

MEDIASTINUM AND HILAR STRUCTURES: Stable.

HEART AND VASCULAR STRUCTURES: Heart normal in size.  Normal vasculature.

BONES: No acute findings.

HARDWARE: Interval placement of an endotracheal tube which terminates approximately 2 to 2-1/2 cm pro
ximal to the yaw.  Interval placement of an enteric tube with the tip and proximal port projecting
 subdiaphragmatically within the left upper quadrant.

OTHER: No other significant finding.



IMPRESSION:  1.  Stable pulmonary exam.

2.  Interval placement of endotracheal and enteric tubes without evidence of complication.



TECHNICAL DOCUMENTATION:  JOB ID:  [id number]

 2011 [employer]- All Rights Reserved

## 2018-01-20 NOTE — RADIOLOGY REPORT (SQ)
EXAM DESCRIPTION:  CHEST SINGLE VIEW



COMPLETED DATE/TIME:  1/20/2018 4:03 pm



REASON FOR STUDY:  hypoxia



COMPARISON:  Chest radiograph 1/20/2018 and chest CT 1/18/2018



EXAM PARAMETERS:  NUMBER OF VIEWS: One view.

TECHNIQUE: Single frontal radiographic view of the chest acquired.

RADIATION DOSE: NA

LIMITATIONS: None.



FINDINGS:  LUNGS AND PLEURA: Stable right greater than left lung base opacities.  Background of emphy
sematous changes with marked left apical architectural distortion.  No evidence of superimposed pneum
othorax.  No large pleural effusion evident.

MEDIASTINUM AND HILAR STRUCTURES: Stable.

HEART AND VASCULAR STRUCTURES: Heart normal in size.  Normal vasculature.

BONES: Previously described rib fractures are again noted.

HARDWARE: None in the chest.

OTHER: No other significant finding.



IMPRESSION:  Stable radiographic appearance of the chest again demonstrating bibasilar airspace opaci
ties.



TECHNICAL DOCUMENTATION:  JOB ID:  [id number]

 2011 [employer]- All Rights Reserved

## 2018-01-20 NOTE — TRANSFER SUMMARY E
Transfer Summary



NAME:     LUDWIN SWENSON

MRN: [id number]

: 1965                   AGE: 52Y

ADMITTED: 2018                TRANSFERRED: 2018



FINAL DIAGNOSIS:

1.  Fall.

2.  Right posterior rib fracture multiple.

3.  Bullous disease of lungs.

4.  COPD.

5.  Severe respiratory failure.



PROCEDURE:

None.



HOSPITAL COURSE:

This is a 52-year-old male who fell on his right back while walking on

ice.  He presented to the emergency room on , early in the morning

with posterior chest pain.  A chest x-ray was done revealing multiple rib

fractures.  A CT scan of the chest was done as well revealing multiple rib

fractures, no pneumothorax, no hemothorax, and severe bullous disease of

the left lung together with bilateral lobe infiltrates.  The patient was

admitted, started on IV antibiotics and IV fluid maintenance.  He was also

started on pain medications, intravenous as well as topical lidocaine

patches for his rib fracture/chest pain.  During the hospital the

patient's chest x-ray was monitored daily and on , the patient

presented with a markedly increased white blood cell count about 19,000

and a chest x-ray also presented with a worsening of the infiltrate on the

right lower lobe.  The medical service was consulted.  The patient was

started on appropriate IV antibiotics for pneumonia, transferred to the

intermediate unit, because his pulse oximeter room air was 89%. 

Subsequently the patient's respiratory status worsened during the

afternoon on , and he was transferred to the ICU.  Soon after his

transfer the patient presented with severe respiratory distress,

agitation.  His pulse oximetry at room air was in the low 80s.  The

patient was emergently intubated by the nurse anesthetist on-call.  Chest

x-ray was obtained revealing good ET tube position, no pneumothorax, and

at the same a nasogastric tube and Sanchez catheter were inserted.  The

patient became difficult to oxygenate and despite maximizing the

ventilatory settings with an FiO2 to 100%, respiratory rate of 12, tidal

volume of 500, and PEEP of 12 his saturation was still in the mid to high

80s.  A decision was made to transfer the patient to a tertiary center

with a higher level of expertise.  [facility] called and a

conversation was entertained with Dr. Hinojosa, and the patient was then

accepted for transfer.  [facility] will arrange for transportation

and a copy of the medical records will be made available for [facility].





DICTATING PHYSICIAN:  NINI ANAND M.D.





[id number]                  DT: 2018

PHY#: [id number]            DD: 2018

ID:   [id number]           JOB#: [id number]       ACCT: [id number]



cc:NINI ANAND M.D.

>

## 2018-01-21 NOTE — PDOC CONSULTATION
Consultation


Consult Date: 01/20/18


Attending physician:: CLAUDIA ARECHIGA


Consult reason:: resp failure  copd   pna





History of Present Illness


Admission Date/PCP: 


  01/19/18 01:40





  





History of Present Illness: 


all information from chart:LUDWIN SWENSON is a 52 year old male status post 

fall on the ice 


Patient stated that he slipped and injured the right chest


He presented to the ED and was diagnosed to have multiple rib fractures -eighth 

through 11-


And bilateral pneumonia


Patient was admitted to the telemetry unit; he developed profound hypoxemia and 

hospitalist consult was called.Patient was sent to ICU intubated and sedated








Past Medical History


Cardiac Medical History: Reports: Hyperlipidema, Hypertension


Pulmonary Medical History: Reports: Chronic Obstructive Pulmonary Disease (COPD)


EENT Medical History: 


   Denies: Ears, Nose


Neurological Medical History: 


   Denies: Multiple Sclerosis, Seizures


Endocrine Medical History: 


   Denies: Gestational Diabetes


Renal/ Medical History: 


   Denies: Nephrolithiasis


Skin Medical History: 


   Denies: Psoriasis


Psychiatric Medical History: Reports: Depression


Traumatic Medical History: 


   Denies: Traumatic Brain Injury


Hematology: 


   Denies: Sickle Cell Disease


Infectious Medical History: 


   Denies: Hepatitis B, Hepatitis C





Past Surgical History


Past Surgical History: Reports: Other - Left orchiectomy in the remote past.





Social History


Information Source: [facility] Records


Lives with: Spouse/Significant other


Smoking Status: Current Every Day Smoker


Passive smoke exposure as: Both


Drugs: None


Hx Prescription Drug Abuse: No


Have you had any respiratory illnesses as a child?: No


Have you been exposed to any sick contacts recently?: No


Have you had any recent respiratory illnesses?: No





- Advance Directive


Resuscitation Status: Full Code





Family History


Parental Family History Reviewed: No


Children Family History Reviewed: No


Sibling(s) Family History Reviewed.: No





Medication/Allergy


Home Medications: 








Amlodipine Besylate [Norvasc 5 mg Tablet] 5 mg PO DAILY 01/19/18 


Aspirin [Aspirin EC] 81 mg PO DAILY 01/19/18 


Atenolol [Tenormin 50 mg Tablet] 50 mg PO DAILY 01/19/18 


Atorvastatin Calcium [Lipitor 10 mg Tablet] 10 mg PO QHS 01/19/18 


Budesonide/Formoterol Fumarate [Symbicort Hfa 80-4.5 Mcg Inhaler 6.9 gm] 2 puff 

IH Q12 01/19/18 


Fluoxetine HCl [Prozac] 20 mg PO Q12 01/19/18 


Multivitamin [Tab-A-Lupe (Multiple Vitamin) Tablet] 1 tab PO DAILY 01/19/18 








Allergies/Adverse Reactions: 


 





chlorpheniramine [From Actifed Cold-Allergy] Allergy (Verified 01/18/18 19:43)


 


phenylephrine [From Actifed Cold-Allergy] Allergy (Verified 01/18/18 19:43)


 


pseudoephedrine [From Actifed Cold-Allergy] Allergy (Verified 01/18/18 19:43)


 


triprolidine [From Actifed Cold-Allergy] Allergy (Verified 01/18/18 19:43)


 











Review of Systems


ROS unobtainable: Due to endotracheal tube





Physical Exam


Vital Signs: 


 











Temp Pulse Resp BP Pulse Ox


 


 98.6 F   87   42 H  103/64   82 L


 


 01/20/18 20:00  01/20/18 20:00  01/20/18 20:00  01/20/18 20:00  01/20/18 20:00








 





Pulse Oximeter Continuous                                  Start:  01/20/18 12:

40


Freq:   RTQ4                                               Status: Complete    

  


 Document     01/20/18 16:00  [facility]  (Rec: 01/20/18 16:25  [facility]  [email or web address])


 Pulse Oximetry Assessment


     Oxygen Saturation ()                  90


     Oxygen Delivery Method                      Bi-pap


     Fraction of Inspired Oxygen (FIO2)          100


     Equipment Usage                             Equipment in Use


     Continuous SpO2 Machine #                   [id number]





 Intake & Output











 01/19/18 01/20/18 01/21/18





 06:59 06:59 06:59


 


Intake Total  360 


 


Output Total  325 445


 


Balance  35 -445


 


Weight  107 kg 











General appearance: PRESENT: no acute distress, disheveled, obese.  ABSENT: 

cooperative, mild distress, morbidly obese, severe distress


Head exam: PRESENT: atraumatic, normocephalic


Eye exam: PRESENT: conjunctiva pale.  ABSENT: conjunctival injection, 

conjunctiva pink, EOMI, nystagmus, periorbital swelling, scleral icterus


Mouth exam: PRESENT: dry mucosa, neck supple, tongue midline, other - ET tube.  

ABSENT: laceration, moist


Neck exam: ABSENT: carotid bruit, JVD, lymphadenopathy, thyromegaly, tracheal 

deviation, tracheostomy


Respiratory exam: PRESENT: decreased breath sounds, prolonged expiratory phas, 

rales, rhonchi, symmetrical, unlabored, other - post rib fx w/o paradox.  ABSENT

: accessory muscle use, clear to auscultation judith, crackles, retraction, stridor

, tachypnea


Cardiovascular exam: PRESENT: RRR, +S1, +S2.  ABSENT: rubs


Pulses: PRESENT: normal radial pulses


GI/Abdominal exam: PRESENT: diminished bowel sounds, soft


Extremities exam: ABSENT: clubbing, joint swelling


Musculoskeletal exam: ABSENT: deformity, dislocation


Neurological exam: ABSENT: alert, awake


Skin exam: PRESENT: dry, warm





Results


Laboratory Results: 


 





 01/20/18 06:37 





 01/20/18 06:37 





 











  01/20/18 01/20/18 01/20/18





  06:37 06:37 06:37


 


WBC  19.6 H  


 


RBC  4.99  


 


Hgb  16.9  


 


Hct  49.5  


 


MCV  99 H  


 


MCH  33.9 H  


 


MCHC  34.2  


 


RDW  13.1  


 


Plt Count  230  


 


Carbonic Acid   


 


HCO3/H2CO3 Ratio   


 


ABG pH   


 


ABG pCO2   


 


ABG pO2   


 


ABG HCO3   


 


ABG O2 Saturation   


 


ABG Base Excess   


 


FiO2   


 


Sodium   141.0 


 


Potassium   4.3 


 


Chloride   102 


 


Carbon Dioxide   24 


 


Anion Gap   15 


 


BUN   47 H 


 


Creatinine   1.32 H 


 


Est GFR ( Amer)   > 60 


 


Est GFR (Non-Af Amer)   57 L 


 


Glucose   111 H 


 


Calcium   10.0 


 


Triglycerides    72














  01/20/18 01/20/18 01/20/18





  10:40 16:19 18:50


 


WBC   


 


RBC   


 


Hgb   


 


Hct   


 


MCV   


 


MCH   


 


MCHC   


 


RDW   


 


Plt Count   


 


Carbonic Acid  1.31  1.34  1.97 H


 


HCO3/H2CO3 Ratio  17:1  20:1  13:1


 


ABG pH  7.35  7.40  7.24 L


 


ABG pCO2  43.5  44.5  65.5 H


 


ABG pO2  61.1 L  56.8 L  63.0 L


 


ABG HCO3  23.4  26.8 H  27.1 H


 


ABG O2 Saturation  90.2 L  89.4 L  87.4 L


 


ABG Base Excess  -2.4  1.5  -2.2


 


FiO2  100%  100%  100%


 


Sodium   


 


Potassium   


 


Chloride   


 


Carbon Dioxide   


 


Anion Gap   


 


BUN   


 


Creatinine   


 


Est GFR ( Amer)   


 


Est GFR (Non-Af Amer)   


 


Glucose   


 


Calcium   


 


Triglycerides   














  01/20/18





  20:45


 


WBC 


 


RBC 


 


Hgb 


 


Hct 


 


MCV 


 


MCH 


 


MCHC 


 


RDW 


 


Plt Count 


 


Carbonic Acid  1.39 H


 


HCO3/H2CO3 Ratio  18:1


 


ABG pH  7.37


 


ABG pCO2  46.1 H


 


ABG pO2  55.3 L


 


ABG HCO3  26.2 H


 


ABG O2 Saturation  87.8 L


 


ABG Base Excess  0.4


 


FiO2  100


 


Sodium 


 


Potassium 


 


Chloride 


 


Carbon Dioxide 


 


Anion Gap 


 


BUN 


 


Creatinine 


 


Est GFR ( Amer) 


 


Est GFR (Non-Af Amer) 


 


Glucose 


 


Calcium 


 


Triglycerides 








 











  01/20/18





  06:37


 


NT-Pro-B Natriuret Pep  137











Impressions: 


 





Chest CT  01/18/18 19:53


IMPRESSION:


 


Right eighth, ninth, 10th, and 11th rib fractures.


 


Prominent left upper lobe bullous emphysema. Cystic spaces in the


left upper thorax are favored to represent bulla over


pneumothorax given lack of left-sided rib fractures.


 


Bilateral lower lobe consolidation, favored to represent


atelectasis over pneumonia given rib fractures.


 


Bilateral adrenal nodules. These likely represent adrenal


adenomas however may be confirmed with dedicated adrenal CT or


MRI.


 


 








Abdomen MRI  01/19/18 00:00


IMPRESSION:  Bilateral adrenal adenomas


Right lower posterior rib fractures.  No right kidney or liver laceration


Dense consolidation right lower lobe, patchy consolidation left base, 

atelectasis versus pneumonia


 








Chest X-Ray  01/20/18 17:45


IMPRESSION:  1.  Stable pulmonary exam.


2.  Interval placement of endotracheal and enteric tubes without evidence of 

complication.


 














Assessment & Plan





- Diagnosis


(1) Acute on chronic respiratory failure with hypoxemia


Is this a current diagnosis for this admission?: Yes   


Plan: 





 Labs- All tests 24 hr











  01/20/18 01/20/18 01/20/18





  10:40 16:19 18:50


 


ABG pH  7.35  7.40  7.24 L


 


ABG pCO2  43.5  44.5  65.5 H


 


ABG pO2  61.1 L  56.8 L  63.0 L


 


ABG O2 Saturation  90.2 L  89.4 L  87.4 L


 


FiO2  100%  100%  100%














  01/20/18





  20:45


 


ABG pH  7.37


 


ABG pCO2  46.1 H


 


ABG pO2  55.3 L


 


ABG O2 Saturation  87.8 L


 


FiO2  100

















(2) Fall


Qualifiers: 


   Encounter type: initial encounter   Qualified Code(s): W19.XXXA - 

Unspecified fall, initial encounter   


Plan: 


2 days ago








(3) Fracture, ribs


Qualifiers: 


   Encounter type: initial encounter   Rib fracture type: multiple ribs   

Fracture type: closed   Laterality: right   Qualified Code(s): S22.41XA - 

Multiple fractures of ribs, right side, initial encounter for closed fracture   


Is this a current diagnosis for this admission?: Yes   





- Time


Total Critical Time (Minutes): 45





- Plan Summary


Plan Summary: 





pcp plans transfer to tertiary [facility]

## 2018-08-10 ENCOUNTER — HOSPITAL ENCOUNTER (EMERGENCY)
Dept: HOSPITAL 62 - ER | Age: 53
Discharge: HOME | End: 2018-08-10
Payer: COMMERCIAL

## 2018-08-10 VITALS — DIASTOLIC BLOOD PRESSURE: 93 MMHG | SYSTOLIC BLOOD PRESSURE: 116 MMHG

## 2018-08-10 DIAGNOSIS — I48.91: Primary | ICD-10-CM

## 2018-08-10 DIAGNOSIS — D72.829: ICD-10-CM

## 2018-08-10 DIAGNOSIS — R53.1: ICD-10-CM

## 2018-08-10 DIAGNOSIS — Z79.82: ICD-10-CM

## 2018-08-10 DIAGNOSIS — Z79.899: ICD-10-CM

## 2018-08-10 DIAGNOSIS — R53.83: ICD-10-CM

## 2018-08-10 DIAGNOSIS — Z88.8: ICD-10-CM

## 2018-08-10 DIAGNOSIS — I10: ICD-10-CM

## 2018-08-10 DIAGNOSIS — J44.9: ICD-10-CM

## 2018-08-10 DIAGNOSIS — F17.200: ICD-10-CM

## 2018-08-10 LAB
ADD MANUAL DIFF: NO
ALBUMIN SERPL-MCNC: 4.1 G/DL (ref 3.5–5)
ALP SERPL-CCNC: 97 U/L (ref 38–126)
ALT SERPL-CCNC: 29 U/L (ref 21–72)
ANION GAP SERPL CALC-SCNC: 11 MMOL/L (ref 5–19)
APPEARANCE UR: (no result)
APTT PPP: YELLOW S
AST SERPL-CCNC: 25 U/L (ref 17–59)
BASOPHILS # BLD AUTO: 0.1 10^3/UL (ref 0–0.2)
BASOPHILS NFR BLD AUTO: 0.6 % (ref 0–2)
BILIRUB DIRECT SERPL-MCNC: 0.3 MG/DL (ref 0–0.4)
BILIRUB SERPL-MCNC: 0.8 MG/DL (ref 0.2–1.3)
BILIRUB UR QL STRIP: (no result)
BUN SERPL-MCNC: 20 MG/DL (ref 7–20)
CALCIUM: 10 MG/DL (ref 8.4–10.2)
CHLORIDE SERPL-SCNC: 106 MMOL/L (ref 98–107)
CK MB SERPL-MCNC: 1.04 NG/ML (ref ?–4.55)
CK SERPL-CCNC: 83 U/L (ref 55–170)
CO2 SERPL-SCNC: 30 MMOL/L (ref 22–30)
EOSINOPHIL # BLD AUTO: 0.1 10^3/UL (ref 0–0.6)
EOSINOPHIL NFR BLD AUTO: 0.8 % (ref 0–6)
ERYTHROCYTE [DISTWIDTH] IN BLOOD BY AUTOMATED COUNT: 13.2 % (ref 11.5–14)
GLUCOSE SERPL-MCNC: 82 MG/DL (ref 75–110)
GLUCOSE UR STRIP-MCNC: NEGATIVE MG/DL
HCT VFR BLD CALC: 52.9 % (ref 37.9–51)
HGB BLD-MCNC: 18.3 G/DL (ref 13.5–17)
KETONES UR STRIP-MCNC: (no result) MG/DL
LYMPHOCYTES # BLD AUTO: 2.6 10^3/UL (ref 0.5–4.7)
LYMPHOCYTES NFR BLD AUTO: 15 % (ref 13–45)
MCH RBC QN AUTO: 33.8 PG (ref 27–33.4)
MCHC RBC AUTO-ENTMCNC: 34.6 G/DL (ref 32–36)
MCV RBC AUTO: 98 FL (ref 80–97)
MONOCYTES # BLD AUTO: 1.6 10^3/UL (ref 0.1–1.4)
MONOCYTES NFR BLD AUTO: 9.2 % (ref 3–13)
NEUTROPHILS # BLD AUTO: 12.8 10^3/UL (ref 1.7–8.2)
NEUTS SEG NFR BLD AUTO: 74.4 % (ref 42–78)
NITRITE UR QL STRIP: NEGATIVE
PH UR STRIP: 5 [PH] (ref 5–9)
PLATELET # BLD: 268 10^3/UL (ref 150–450)
POTASSIUM SERPL-SCNC: 4.3 MMOL/L (ref 3.6–5)
PROT SERPL-MCNC: 7.3 G/DL (ref 6.3–8.2)
PROT UR STRIP-MCNC: 100 MG/DL
RBC # BLD AUTO: 5.42 10^6/UL (ref 4.35–5.55)
SODIUM SERPL-SCNC: 146.6 MMOL/L (ref 137–145)
SP GR UR STRIP: 1.03
TOTAL CELLS COUNTED % (AUTO): 100 %
TROPONIN I SERPL-MCNC: < 0.012 NG/ML
UROBILINOGEN UR-MCNC: 2 MG/DL (ref ?–2)
WBC # BLD AUTO: 17.2 10^3/UL (ref 4–10.5)

## 2018-08-10 PROCEDURE — 82550 ASSAY OF CK (CPK): CPT

## 2018-08-10 PROCEDURE — 81001 URINALYSIS AUTO W/SCOPE: CPT

## 2018-08-10 PROCEDURE — 93010 ELECTROCARDIOGRAM REPORT: CPT

## 2018-08-10 PROCEDURE — 99285 EMERGENCY DEPT VISIT HI MDM: CPT

## 2018-08-10 PROCEDURE — 87086 URINE CULTURE/COLONY COUNT: CPT

## 2018-08-10 PROCEDURE — 93005 ELECTROCARDIOGRAM TRACING: CPT

## 2018-08-10 PROCEDURE — 84484 ASSAY OF TROPONIN QUANT: CPT

## 2018-08-10 PROCEDURE — 82553 CREATINE MB FRACTION: CPT

## 2018-08-10 PROCEDURE — 85025 COMPLETE CBC W/AUTO DIFF WBC: CPT

## 2018-08-10 PROCEDURE — 96374 THER/PROPH/DIAG INJ IV PUSH: CPT

## 2018-08-10 PROCEDURE — 80053 COMPREHEN METABOLIC PANEL: CPT

## 2018-08-10 PROCEDURE — 36415 COLL VENOUS BLD VENIPUNCTURE: CPT

## 2018-08-10 NOTE — ER DOCUMENT REPORT
ED General





- General


Chief Complaint: Weakness


Stated Complaint: HIP PAIN, WEAKNESS


Time Seen by Provider: 08/10/18 16:57


Mode of Arrival: Ambulatory


Notes: 





Patient is a 53-year-old male with a past medical history of COPD and 

hypertension who presents with feeling generally weak and fatigued for the past 

12 hours.  The patient reports that he is concerned that he may have been 

exposed to cleaning agents as he has been helping to clean his brother's house.

  He denies any chest pain or shortness of breath but states that he has felt 

some "fluttering" in his chest.  He denies any history of similar symptoms in 

the past.  Nothing improves or worsens his symptoms.  He denies any syncope, 

nausea or vomiting.  He has not seen his general doctor regarding today's 

concerns.  No drug or alcohol ingestion.  No recent changes in medications.


TRAVEL OUTSIDE OF THE [place] IN LAST 30 DAYS: No





- Related Data


Allergies/Adverse Reactions: 


 





chlorpheniramine [From Actifed Cold-Allergy] Allergy (Verified 08/10/18 16:14)


 


phenylephrine [From Actifed Cold-Allergy] Allergy (Verified 08/10/18 16:14)


 


pseudoephedrine [From Actifed Cold-Allergy] Allergy (Verified 08/10/18 16:14)


 


triprolidine [From Actifed Cold-Allergy] Allergy (Verified 08/10/18 16:14)


 











Past Medical History





- General


Information source: Patient





- Social History


Smoking Status: Current Every Day Smoker


Frequency of alcohol use: None


Drug Abuse: None


Lives with: Family


Family History: Reviewed & Not Pertinent


Patient has suicidal ideation: No


Patient has homicidal ideation: No





- Past Medical History


Cardiac Medical History: Reports: Hx Hypercholesterolemia, Hx Hypertension


Pulmonary Medical History: Reports: Hx COPD


Neurological Medical History: Denies: Hx Seizures


Renal/ Medical History: Denies: Hx Peritoneal Dialysis


Skin Medical History: Denies Hx Psoriasis


Psychiatric Medical History: Reports: Hx Depression


Traumatic Medical History: Denies: Hx Traumatic Brain Injury


Past Surgical History: Reports: Hx Testicular Surgery - Orchiectomy, Other - 

Left orchiectomy in the remote past.





Review of Systems





- Review of Systems


Notes: 





Constitutional: Negative for fever.


HENT: Negative for sore throat.


Eyes: Negative for visual changes.


Cardiovascular: Negative for chest pain.  Positive for palpitations


Respiratory: Negative for shortness of breath.


Gastrointestinal: Negative for abdominal pain, vomiting or diarrhea.


Genitourinary: Negative for dysuria.


Musculoskeletal: Negative for back pain.


Skin: Negative for rash.


Neurological: Negative for headaches, weakness or numbness.





10 point ROS negative except as marked above and in HPI.





Physical Exam





- Vital signs


Vitals: 


 











Temp Pulse Resp BP Pulse Ox


 


 98.3 F   58 L  20   90/76 L  94 


 


 08/10/18 16:34  08/10/18 16:34  08/10/18 16:34  08/10/18 16:34  08/10/18 16:34











Interpretation: Normal


Notes: 





PHYSICAL EXAMINATION:





GENERAL: Well-appearing, well-nourished and in no acute distress.





HEAD: Atraumatic, normocephalic.





EYES: Pupils equal round and reactive to light, extraocular movements intact, 

sclera anicteric, conjunctiva are normal.





ENT: nares patent, oropharynx clear without exudates.  Moist mucous membranes.





NECK: Normal range of motion, supple without lymphadenopathy





LUNGS: Breath sounds clear to auscultation bilaterally and equal.  No wheezes 

rales or rhonchi.





HEART: Irregular regular rate and rhythm without murmurs





ABDOMEN: Soft, nontender, normoactive bowel sounds.  No guarding, no rebound.  

No masses appreciated.





EXTREMITIES: Normal range of motion, no pitting or edema.  No cyanosis.





NEUROLOGICAL: No focal neurological deficits. Moves all extremities 

spontaneously and on command.





PSYCH: Normal mood, normal affect.





SKIN: Warm, Dry, normal turgor, no rashes or lesions noted.





Course





- Re-evaluation


Re-evalutation: 





08/10/18 18:35


Patient presents with new onset A. fib with associated generalized fatigue but 

no other localizing symptoms.  Patient denies any chest pain, shortness of 

breath, nausea, vomiting, and EKG is without ischemic changes.  His blood 

pressure at time of my assessment is within normal limits at 138 on 68 a heart 

rate of 137.  His chadsvasc score is 1 placing him in the moderate risk 

category.  The patient does already take aspirin and I have encouraged him to 

continue taking this medication.  His laboratories are noted to have a 

nonspecific leukocytosis which is not relevant to the patient's current 

clinical presentation as he has no infectious or inflammatory symptoms.  

Cardiac markers within acceptable limits.  Patient received metoprolol IV 5 mg 

3 with acceptable rate control and received oral metoprolol.  He will be 

continued on oral metoprolol at home and his oral atenolol has been 

discontinued.  The patient has been provided with cardiology follow-up and I 

have emphasized the importance of close follow-up with both cardiology and his 

primary care doctor.  At this time will discharge with return precautions and 

follow-up recommendations.  Verbal discharge instructions given a the bedside 

and opportunity for questions given. Medication warnings reviewed. Patient is 

in agreement with this plan and has verbalized understanding of return 

precautions and the need for primary care follow-up in the next 24-72 hours.





- Vital Signs


Vital signs: 


 











Temp Pulse Resp BP Pulse Ox


 


 98.3 F   102 H  20   116/93 H  95 


 


 08/10/18 16:34  08/10/18 20:12  08/10/18 16:34  08/10/18 20:01  08/10/18 20:10














- Laboratory


Result Diagrams: 


 08/10/18 17:28





 08/10/18 17:28


Laboratory results interpreted by me: 


 











  08/10/18 08/10/18 08/10/18





  17:28 17:28 17:28


 


WBC  17.2 H  


 


Hgb  18.3 H  


 


Hct  52.9 H  


 


MCV  98 H  


 


MCH  33.8 H  


 


Absolute Neutrophils  12.8 H  


 


Absolute Monocytes  1.6 H  


 


Sodium   146.6 H 


 


Urine Protein    100 H


 


Urine Ketones    TRACE H


 


Urine Blood    MODERATE H


 


Urine Bilirubin    SMALL H


 


Urine Urobilinogen    2.0 H


 


Ur Leukocyte Esterase    SMALL H














- EKG Interpretation by Me


Additional EKG results interpreted by me: 





08/10/18 18:37


Atrial fibrillation with rapid ventricular response.  Rate 142.  No ST 

elevations or depressions.





Discharge





- Discharge


Clinical Impression: 


 Atrial fibrillation with rapid ventricular response, New onset atrial 

fibrillation





Fatigue


Qualifiers:


 Fatigue type: unspecified Qualified Code(s): R53.83 - Other fatigue





Condition: Stable


Disposition: HOME, SELF-CARE


Additional Instructions: 


You have been diagnosed with new onset atrial fibrillation.  Continue to take 

the aspirin that you already take.  Discontinue atenolol.  Begin taking 

metoprolol 25 mg twice daily.  You need to return to the emergency department 

immediately if you become lightheaded, pass out, develop chest pain, develop 

shortness of breath, nausea, vomiting, or have any other symptoms that are all 

concerning to you.


Prescriptions: 


Metoprolol Tartrate 50 mg PO BID #60 tablet


Referrals: 


JAYNA ÁLVAREZ MD [ACTIVE STAFF] - Follow up tomorrow

## 2018-08-10 NOTE — ER DOCUMENT REPORT
ED Medical Screen (RME)





- General


Chief Complaint: Weakness


Stated Complaint: HIP PAIN, WEAKNESS


Time Seen by Provider: 08/10/18 16:57


Mode of Arrival: Ambulatory


Information source: Patient


TRAVEL OUTSIDE OF THE [place] IN LAST 30 DAYS: No





- HPI


Patient complains to provider of: weak; stressed


Onset: This morning - pt states he feels weak and stressed since earlier today.

  Denies CP





- Related Data


Allergies/Adverse Reactions: 


 





chlorpheniramine [From Actifed Cold-Allergy] Allergy (Verified 08/10/18 16:14)


 


phenylephrine [From Actifed Cold-Allergy] Allergy (Verified 08/10/18 16:14)


 


pseudoephedrine [From Actifed Cold-Allergy] Allergy (Verified 08/10/18 16:14)


 


triprolidine [From Actifed Cold-Allergy] Allergy (Verified 08/10/18 16:14)


 











Past Medical History





- Past Medical History


Cardiac Medical History: Reports: Hx Hypercholesterolemia, Hx Hypertension


Pulmonary Medical History: Reports: Hx COPD


Neurological Medical History: Denies: Hx Seizures


Renal/ Medical History: Denies: Hx Peritoneal Dialysis


Skin Medical History: Denies Hx Psoriasis


Psychiatric Medical History: Reports: Hx Depression


Traumatic Medical History: Denies: Hx Traumatic Brain Injury


Past Surgical History: Reports: Other - Left orchiectomy in the remote past.





- Immunizations


History of Influenza Vaccine for 10/2017 - 3/2018 Season: Yes


Influenza Administration Date for 10/2017 - 3/2018 Season: 10/01/17





Physical Exam





- Vital signs


Vitals: 





 











Temp Pulse Resp BP Pulse Ox


 


 98.3 F   58 L  20   90/76 L  94 


 


 08/10/18 16:34  08/10/18 16:34  08/10/18 16:34  08/10/18 16:34  08/10/18 16:34














Course





- Vital Signs


Vital signs: 





 











Temp Pulse Resp BP Pulse Ox


 


 98.3 F   58 L  20   90/76 L  94 


 


 08/10/18 16:34  08/10/18 16:34  08/10/18 16:34  08/10/18 16:34  08/10/18 16:34

## 2018-08-11 NOTE — EKG REPORT
SEVERITY:- ABNORMAL ECG -

ATRIAL FIBRILLATION

PROBABLE INFERIOR INFARCT, OLD

PROLONGED QT INTERVAL

:

Confirmed by: Savanna Holt MD 11-Aug-2018 00:05:17

## 2018-12-16 ENCOUNTER — HOSPITAL ENCOUNTER (INPATIENT)
Dept: HOSPITAL 62 - ER | Age: 53
LOS: 3 days | Discharge: HOME | DRG: 310 | End: 2018-12-19
Attending: FAMILY MEDICINE | Admitting: FAMILY MEDICINE
Payer: COMMERCIAL

## 2018-12-16 DIAGNOSIS — R09.02: ICD-10-CM

## 2018-12-16 DIAGNOSIS — E78.00: ICD-10-CM

## 2018-12-16 DIAGNOSIS — Z88.8: ICD-10-CM

## 2018-12-16 DIAGNOSIS — Z60.2: ICD-10-CM

## 2018-12-16 DIAGNOSIS — Z82.49: ICD-10-CM

## 2018-12-16 DIAGNOSIS — F17.220: ICD-10-CM

## 2018-12-16 DIAGNOSIS — F32.9: ICD-10-CM

## 2018-12-16 DIAGNOSIS — Z79.899: ICD-10-CM

## 2018-12-16 DIAGNOSIS — Z83.3: ICD-10-CM

## 2018-12-16 DIAGNOSIS — I48.0: Primary | ICD-10-CM

## 2018-12-16 DIAGNOSIS — Z90.79: ICD-10-CM

## 2018-12-16 DIAGNOSIS — I95.9: ICD-10-CM

## 2018-12-16 DIAGNOSIS — I10: ICD-10-CM

## 2018-12-16 DIAGNOSIS — E66.9: ICD-10-CM

## 2018-12-16 DIAGNOSIS — Z79.82: ICD-10-CM

## 2018-12-16 LAB
ADD MANUAL DIFF: NO
ALBUMIN SERPL-MCNC: 4.2 G/DL (ref 3.5–5)
ALP SERPL-CCNC: 115 U/L (ref 38–126)
ALT SERPL-CCNC: 27 U/L (ref 21–72)
ANION GAP SERPL CALC-SCNC: 10 MMOL/L (ref 5–19)
AST SERPL-CCNC: 30 U/L (ref 17–59)
BASE EXCESS BLDV CALC-SCNC: -0.6 MMOL/L
BASOPHILS # BLD AUTO: 0.1 10^3/UL (ref 0–0.2)
BASOPHILS NFR BLD AUTO: 0.9 % (ref 0–2)
BILIRUB DIRECT SERPL-MCNC: 0.3 MG/DL (ref 0–0.4)
BILIRUB SERPL-MCNC: 0.7 MG/DL (ref 0.2–1.3)
BUN SERPL-MCNC: 30 MG/DL (ref 7–20)
CALCIUM: 10.4 MG/DL (ref 8.4–10.2)
CHLORIDE SERPL-SCNC: 107 MMOL/L (ref 98–107)
CO2 SERPL-SCNC: 30 MMOL/L (ref 22–30)
EOSINOPHIL # BLD AUTO: 0.2 10^3/UL (ref 0–0.6)
EOSINOPHIL NFR BLD AUTO: 1.5 % (ref 0–6)
ERYTHROCYTE [DISTWIDTH] IN BLOOD BY AUTOMATED COUNT: 13 % (ref 11.5–14)
GLUCOSE SERPL-MCNC: 112 MG/DL (ref 75–110)
HCO3 BLDV-SCNC: 24.8 MMOL/L (ref 20–32)
HCT VFR BLD CALC: 50.1 % (ref 37.9–51)
HGB BLD-MCNC: 17.4 G/DL (ref 13.5–17)
LYMPHOCYTES # BLD AUTO: 2.4 10^3/UL (ref 0.5–4.7)
LYMPHOCYTES NFR BLD AUTO: 16.8 % (ref 13–45)
MCH RBC QN AUTO: 34.2 PG (ref 27–33.4)
MCHC RBC AUTO-ENTMCNC: 34.6 G/DL (ref 32–36)
MCV RBC AUTO: 99 FL (ref 80–97)
MONOCYTES # BLD AUTO: 1.6 10^3/UL (ref 0.1–1.4)
MONOCYTES NFR BLD AUTO: 11 % (ref 3–13)
NEUTROPHILS # BLD AUTO: 10 10^3/UL (ref 1.7–8.2)
NEUTS SEG NFR BLD AUTO: 69.8 % (ref 42–78)
PCO2 BLDV: 43.3 MMHG (ref 35–63)
PH BLDV: 7.38 [PH] (ref 7.3–7.42)
PLATELET # BLD: 262 10^3/UL (ref 150–450)
POTASSIUM SERPL-SCNC: 5.6 MMOL/L (ref 3.6–5)
PROT SERPL-MCNC: 7.2 G/DL (ref 6.3–8.2)
RBC # BLD AUTO: 5.07 10^6/UL (ref 4.35–5.55)
SODIUM SERPL-SCNC: 146.9 MMOL/L (ref 137–145)
TOTAL CELLS COUNTED % (AUTO): 100 %
WBC # BLD AUTO: 14.3 10^3/UL (ref 4–10.5)

## 2018-12-16 PROCEDURE — 85379 FIBRIN DEGRADATION QUANT: CPT

## 2018-12-16 PROCEDURE — 93306 TTE W/DOPPLER COMPLETE: CPT

## 2018-12-16 PROCEDURE — 84484 ASSAY OF TROPONIN QUANT: CPT

## 2018-12-16 PROCEDURE — 84439 ASSAY OF FREE THYROXINE: CPT

## 2018-12-16 PROCEDURE — 82962 GLUCOSE BLOOD TEST: CPT

## 2018-12-16 PROCEDURE — 80048 BASIC METABOLIC PNL TOTAL CA: CPT

## 2018-12-16 PROCEDURE — 85025 COMPLETE CBC W/AUTO DIFF WBC: CPT

## 2018-12-16 PROCEDURE — 96361 HYDRATE IV INFUSION ADD-ON: CPT

## 2018-12-16 PROCEDURE — 71045 X-RAY EXAM CHEST 1 VIEW: CPT

## 2018-12-16 PROCEDURE — 85027 COMPLETE CBC AUTOMATED: CPT

## 2018-12-16 PROCEDURE — 83036 HEMOGLOBIN GLYCOSYLATED A1C: CPT

## 2018-12-16 PROCEDURE — 84481 FREE ASSAY (FT-3): CPT

## 2018-12-16 PROCEDURE — 99285 EMERGENCY DEPT VISIT HI MDM: CPT

## 2018-12-16 PROCEDURE — 80053 COMPREHEN METABOLIC PANEL: CPT

## 2018-12-16 PROCEDURE — 83605 ASSAY OF LACTIC ACID: CPT

## 2018-12-16 PROCEDURE — 82803 BLOOD GASES ANY COMBINATION: CPT

## 2018-12-16 PROCEDURE — 83735 ASSAY OF MAGNESIUM: CPT

## 2018-12-16 PROCEDURE — 93010 ELECTROCARDIOGRAM REPORT: CPT

## 2018-12-16 PROCEDURE — 36415 COLL VENOUS BLD VENIPUNCTURE: CPT

## 2018-12-16 PROCEDURE — 93005 ELECTROCARDIOGRAM TRACING: CPT

## 2018-12-16 PROCEDURE — 84443 ASSAY THYROID STIM HORMONE: CPT

## 2018-12-16 PROCEDURE — 96374 THER/PROPH/DIAG INJ IV PUSH: CPT

## 2018-12-16 PROCEDURE — 82550 ASSAY OF CK (CPK): CPT

## 2018-12-16 PROCEDURE — 94640 AIRWAY INHALATION TREATMENT: CPT

## 2018-12-16 PROCEDURE — 82553 CREATINE MB FRACTION: CPT

## 2018-12-16 SDOH — SOCIAL STABILITY - SOCIAL INSECURITY: PROBLEMS RELATED TO LIVING ALONE: Z60.2

## 2018-12-16 NOTE — ER DOCUMENT REPORT
ED Medical Screen (RME)





- General


Chief Complaint: Palpitations


Stated Complaint: CHEST TIGHTNESS, HEARTRATE ISSUE, SOB


Notes: 





Patient is a 53-year-old male who presents to the emergency department with a 

chief complaint of chest tightness.  He stated his symptoms started this 

afternoon.  He denies any actual chest "pain."  He also has complaints of 

palpitations.  He has a history of hypertension, dyslipidemia, and COPD.  He 

was diagnosed with early onset atrial fibrillation this August.  He did take an 

metoprolol this afternoon.


TRAVEL OUTSIDE OF THE [place] IN LAST 30 DAYS: No





- Related Data


Allergies/Adverse Reactions: 


 





chlorpheniramine [From Actifed Cold-Allergy] Allergy (Verified 08/10/18 16:14)


 


phenylephrine [From Actifed Cold-Allergy] Allergy (Verified 08/10/18 16:14)


 


pseudoephedrine [From Actifed Cold-Allergy] Allergy (Verified 08/10/18 16:14)


 


triprolidine [From Actifed Cold-Allergy] Allergy (Verified 08/10/18 16:14)


 











Past Medical History





- General


Information source: Patient





- Past Medical History


Cardiac Medical History: Reports: Hx Hypercholesterolemia, Hx Hypertension


Pulmonary Medical History: Reports: Hx COPD


Neurological Medical History: Denies: Hx Seizures


Renal/ Medical History: Denies: Hx Peritoneal Dialysis


Skin Medical History: Denies Hx Psoriasis


Psychiatric Medical History: Reports: Hx Depression


Traumatic Medical History: Denies: Hx Traumatic Brain Injury


Past Surgical History: Reports: Hx Testicular Surgery - Orchiectomy, Other - 

Left orchiectomy in the remote past.





- Immunizations


History of Influenza Vaccine for 10/2017 - 3/2018 Season: Yes


Influenza Administration Date for 10/2017 - 3/2018 Season: 10/01/17





Physical Exam





- Vital signs


Vitals: 


 











Resp Pulse Ox


 


 10 L  90 L


 


 12/16/18 19:07  12/16/18 19:07














- Cardiovascular


Rhythm: Irregularly irregular





Course





- Vital Signs


Vital signs: 


 











Temp Pulse Resp BP Pulse Ox


 


 98.5 F      22 H  91/68 L  92 


 


 12/16/18 19:10     12/16/18 19:15  12/16/18 19:15  12/16/18 19:15














- Laboratory


Result Diagrams: 


 12/16/18 19:29





 12/16/18 19:29


Laboratory results interpreted by me: 


 











  12/16/18





  19:29


 


WBC  14.3 H


 


Hgb  17.4 H


 


MCV  99 H


 


MCH  34.2 H


 


Absolute Neutrophils  10.0 H


 


Absolute Monocytes  1.6 H

## 2018-12-16 NOTE — RADIOLOGY REPORT (SQ)
EXAM DESCRIPTION:  CHEST SINGLE VIEW



COMPLETED DATE/TIME:  12/16/2018 7:31 pm



REASON FOR STUDY:  chest pain



COMPARISON:  Chest x-ray 1/20/2018.  CT chest 1/18/2018.



EXAM PARAMETERS:  NUMBER OF VIEWS: One view.

TECHNIQUE:  2 frontal radiographic views of the chest acquired.

RADIATION DOSE: NA

LIMITATIONS: None.



FINDINGS:  LUNGS AND PLEURA: Emphysematous changes are again noted.  No consolidation, pleural effusi
on or pneumothorax.

MEDIASTINUM AND HILAR STRUCTURES: No masses.  Contour normal.

HEART AND VASCULAR STRUCTURES: Heart normal in size.  Normal vasculature.

BONES: Remote right-sided rib fractures.

HARDWARE: None in the chest.



IMPRESSION:  Emphysema.  Otherwise, no acute radiographic findings in the chest.



TECHNICAL DOCUMENTATION:  JOB ID:  [id number]

[id number]

 2011 [employer]- All Rights Reserved



Reading location - IP/workstation name: [email or web address]

## 2018-12-16 NOTE — ER DOCUMENT REPORT
ED General





- General


Chief Complaint: Palpitations


Stated Complaint: CHEST TIGHTNESS, HEARTRATE ISSUE, SOB


Notes: 





Patient is a 53-year-old male who presents to the emergency department with a 

chief complaint of chest tightness, palpitations, and shortness of breath.  He 

states his symptoms started this afternoon around 1600.  Nothing makes his 

symptoms better, nothing aggravates his symptoms.  He has a history of 

hypertension, COPD, and hyperlipidemia.  In August he was diagnosed with early 

onset atrial fibrillation.  He was placed on metoprolol for management of his 

hypertension and atrial fibrillation.


TRAVEL OUTSIDE OF THE [place] IN LAST 30 DAYS: No





- Related Data


Allergies/Adverse Reactions: 


 





chlorpheniramine [From Actifed Cold-Allergy] Allergy (Verified 08/10/18 16:14)


 


phenylephrine [From Actifed Cold-Allergy] Allergy (Verified 08/10/18 16:14)


 


pseudoephedrine [From Actifed Cold-Allergy] Allergy (Verified 08/10/18 16:14)


 


triprolidine [From Actifed Cold-Allergy] Allergy (Verified 08/10/18 16:14)


 











Past Medical History





- Social History


Family History: Reviewed & Not Pertinent





- Past Medical History


Cardiac Medical History: Reports: Hx Hypercholesterolemia, Hx Hypertension


Pulmonary Medical History: Reports: Hx COPD


Neurological Medical History: Denies: Hx Seizures


Renal/ Medical History: Denies: Hx Peritoneal Dialysis


Skin Medical History: Denies Hx Psoriasis


Psychiatric Medical History: Reports: Hx Depression


Traumatic Medical History: Denies: Hx Traumatic Brain Injury


Past Surgical History: Reports: Hx Testicular Surgery - Orchiectomy, Other - 

Left orchiectomy in the remote past.





Physical Exam





- Vital signs


Vitals: 





 











Resp Pulse Ox


 


 10 L  90 L


 


 12/16/18 19:07  12/16/18 19:07














Course





- Vital Signs


Vital signs: 





 











Temp Pulse Resp BP Pulse Ox


 


 98.5 F      22 H  91/68 L  92 


 


 12/16/18 19:10     12/16/18 19:15  12/16/18 19:15  12/16/18 19:15

## 2018-12-16 NOTE — ER DOCUMENT REPORT
ED General





- General


Chief Complaint: Palpitations


Stated Complaint: CHEST TIGHTNESS, HEARTRATE ISSUE, SOB


Cannot obtain history due to: Unstable vital signs


Notes: 





Patient is a 53-year-old male with a past medical history of atrial fibrillation

, hypertension, COPD, presents with chest tightness, palpitations and 

lightheadedness.  Patient states that the symptoms started while at work rather 

abruptly and have gotten worse since that time.  States that he has taken his 

metoprolol as prescribed but has not seemed to improve his symptoms.  Denies a 

history of similar symptoms in the past.  He does describe the chest discomfort 

as a tightness or heaviness of the chest.  Nothing improves or worsens her 

symptoms.  No radiation of the pain.  No associated nausea or diaphoresis but 

does state that he feels somewhat short of breath.  He has not seen his general 

physician regarding today's concerns.  No history of coronary artery disease, 

DVT or pulmonary embolus.


TRAVEL OUTSIDE OF THE [place] IN LAST 30 DAYS: No





- Related Data


Allergies/Adverse Reactions: 


 





chlorpheniramine [From Actifed Cold-Allergy] Allergy (Verified 08/10/18 16:14)


 


phenylephrine [From Actifed Cold-Allergy] Allergy (Verified 08/10/18 16:14)


 


pseudoephedrine [From Actifed Cold-Allergy] Allergy (Verified 08/10/18 16:14)


 


triprolidine [From Actifed Cold-Allergy] Allergy (Verified 08/10/18 16:14)


 











Past Medical History





- General


Information source: Patient





- Social History


Smoking Status: Current Every Day Smoker


Frequency of alcohol use: None


Drug Abuse: None


Lives with: Family


Family History: Reviewed & Not Pertinent





- Past Medical History


Cardiac Medical History: Reports: Hx Hypercholesterolemia, Hx Hypertension


Pulmonary Medical History: Reports: Hx COPD


Neurological Medical History: Denies: Hx Seizures


Renal/ Medical History: Denies: Hx Peritoneal Dialysis


Skin Medical History: Denies Hx Psoriasis


Psychiatric Medical History: Reports: Hx Depression


Traumatic Medical History: Denies: Hx Traumatic Brain Injury


Past Surgical History: Reports: Hx Testicular Surgery - Orchiectomy, Other - 

Left orchiectomy in the remote past.





Review of Systems





- Review of Systems


Notes: 





Constitutional: Negative for fever.


HENT: Negative for sore throat.


Eyes: Negative for visual changes.


Cardiovascular: Positive for chest discomfort and palpitations


Respiratory: Positive for shortness of breath.


Gastrointestinal: Negative for abdominal pain, vomiting or diarrhea.


Genitourinary: Negative for dysuria.


Musculoskeletal: Negative for back pain.


Skin: Negative for rash.


Neurological: Negative for headaches, weakness or numbness.





10 point ROS negative except as marked above and in HPI.





Physical Exam





- Vital signs


Vitals: 


 











Resp Pulse Ox


 


 10 L  90 L


 


 12/16/18 19:07  12/16/18 19:07











Interpretation: Hypotensive, Tachycardic, Hypoxic, Tachypneic


Notes: 





PHYSICAL EXAMINATION:





GENERAL: Somewhat ill in appearance but in no acute distress





HEAD: Atraumatic, normocephalic.





EYES: Pupils equal round and reactive to light, extraocular movements intact, 

sclera anicteric, conjunctiva are normal.





ENT: nares patent, oropharynx clear without exudates.  Moderately dry mucous 

membranes.





NECK: Normal range of motion, supple without lymphadenopathy





LUNGS: Breath sounds clear to auscultation bilaterally and equal.  No wheezes 

rales or rhonchi.





HEART: Irregular regular tachycardia without murmurs





ABDOMEN: Soft, nontender, normoactive bowel sounds.  No guarding, no rebound.  

No masses appreciated.





EXTREMITIES: Normal range of motion, no pitting or edema.  No cyanosis.





NEUROLOGICAL: No focal neurological deficits. Moves all extremities 

spontaneously and on command.





PSYCH: Normal mood, normal affect.





SKIN: Warm, Dry, normal turgor, no rashes or lesions noted.





Course





- Re-evaluation


Re-evalutation: 





12/16/18 19:36


I did assume care from the nurse practitioner due to the critical nature of 

this patient.  I have been at the patient's bedside for the past 20 minutes 

performing a new assessment, perform bedside ultrasound and form a care plan 

with the patient and nursing staff.  In summary this patient is presenting with 

chest tightness, hypotension, and A. fib with rapid ventricular response.  The 

patient's pressures did come as low as 75 systolic.  The patient's initial 

heart rates were in between the 130s and 150s.  He has taken his metoprolol 

twice a day.  Assessment the patient is awake, alert, mildly ill in appearance.

  A bedside echocardiogram is without any evidence of a pericardial effusion or 

tamponade.  Stat portal chest x-ray without any evidence of a pneumothorax.  

Initial EKG does show A. fib with rapid ventricular response but no evidence of 

ischemic changes.  Troponin assay is pending.  D-dimer is also pending as PE 

could present in this manner given the patient's chest tightness, shortness of 

breath, increased hypoxia, hypotension and rapid tachycardia.  Patient's rapid 

A. fib could also be accounting for the patient's hypotension although I will 

initially began with fluid resuscitation to see if this results in improvement 

of the patient's current hypertension for proceeding to rate control agents.  

Patient is critically ill, will continue to reassess at regular intervals.


12/16/18 19:53


The patient's blood pressure is improved dramatically after receiving the first 

liter of fluid.  Ongoing fluid resuscitation.  Patient does remain tachycardic 

but is improving in his clinical appearance.  Will continue to reassess 

regularly.


12/16/18 20:19


Patient has continued to have normalization of his blood pressure currently 114/

92.  He does however remain quite tachycardic current rate 132-142.  A 

diltiazem bolus 10 mg will be administered followed by initiation of an 

infusion.  Awaiting d-dimer result.  The remainder the laboratories otherwise 

unremarkable.


12/16/18 20:58


Patient's heart rate has currently the 110s-120 and his blood pressure remains 

within acceptable limits.  I discussed this case with the hospitalist who has 

accepted the patient for admission.





- Vital Signs


Vital signs: 


 











Temp Pulse Resp BP Pulse Ox


 


 97.9 F   102 H  17   133/84 H  93 


 


 12/17/18 00:30  12/17/18 00:54  12/17/18 00:54  12/17/18 00:30  12/17/18 00:54














- Laboratory


Result Diagrams: 


 12/16/18 19:29





 12/16/18 19:29


Laboratory results interpreted by me: 


 











  12/16/18 12/16/18





  19:29 19:29


 


WBC  14.3 H 


 


Hgb  17.4 H 


 


MCV  99 H 


 


MCH  34.2 H 


 


Absolute Neutrophils  10.0 H 


 


Absolute Monocytes  1.6 H 


 


Sodium   146.9 H


 


Potassium   5.6 H


 


BUN   30 H


 


Glucose   112 H


 


Calcium   10.4 H














- Diagnostic Test


Radiology reviewed: Image reviewed, Reports reviewed


Radiology results interpreted by me: 





12/16/18 19:39


Chest x-ray: No acute infiltrate pneumothorax





- EKG Interpretation by Me


Additional EKG results interpreted by me: 





12/16/18 19:39


Atrial fibrillation.  Rate 159.  No ST elevations or depressions.  QTC is 489.





Critical Care Note





- Critical Care Note


Total time excluding time spent on procedures (mins): 40


Comments: 





Critical care time spent obtaining history from patient or surrogate, 

discussions with consultants, development of treatment plan with patient or 

surrogate, evaluation of patient's response to treatment, examination of patient

, ordering and performing treatments and interventions, ordering and review of 

laboratory studies, re-evaluation of patient's condition, ordering and review 

of radiographic studies and review of old charts





Discharge





- Discharge


Clinical Impression: 


 Atrial fibrillation with rapid ventricular response, Hypoxia





Hypotension


Qualifiers:


 Hypotension type: unspecified hypotension type Qualified Code(s): I95.9 - 

Hypotension, unspecified





Condition: Fair


Disposition: ADMITTED AS INPATIENT


Admitting Provider: Hospitalist


Unit Admitted: [facility]

## 2018-12-17 LAB
ANION GAP SERPL CALC-SCNC: 8 MMOL/L (ref 5–19)
BUN SERPL-MCNC: 24 MG/DL (ref 7–20)
CALCIUM: 8.7 MG/DL (ref 8.4–10.2)
CHLORIDE SERPL-SCNC: 109 MMOL/L (ref 98–107)
CK MB SERPL-MCNC: 1.31 NG/ML (ref ?–4.55)
CK MB SERPL-MCNC: 1.55 NG/ML (ref ?–4.55)
CO2 SERPL-SCNC: 27 MMOL/L (ref 22–30)
ERYTHROCYTE [DISTWIDTH] IN BLOOD BY AUTOMATED COUNT: 12.9 % (ref 11.5–14)
FREE T4 (FREE THYROXINE): 1.45 NG/DL (ref 0.78–2.19)
GLUCOSE SERPL-MCNC: 173 MG/DL (ref 75–110)
HCT VFR BLD CALC: 46 % (ref 37.9–51)
HGB BLD-MCNC: 15.7 G/DL (ref 13.5–17)
MCH RBC QN AUTO: 33.3 PG (ref 27–33.4)
MCHC RBC AUTO-ENTMCNC: 34.2 G/DL (ref 32–36)
MCV RBC AUTO: 97 FL (ref 80–97)
PLATELET # BLD: 221 10^3/UL (ref 150–450)
POTASSIUM SERPL-SCNC: 3.9 MMOL/L (ref 3.6–5)
RBC # BLD AUTO: 4.72 10^6/UL (ref 4.35–5.55)
SODIUM SERPL-SCNC: 144.2 MMOL/L (ref 137–145)
T3FREE SERPL-MCNC: 4.45 PG/ML (ref 2.77–5.27)
TROPONIN I SERPL-MCNC: < 0.012 NG/ML
TROPONIN I SERPL-MCNC: < 0.012 NG/ML
TSH SERPL-ACNC: 0.89 UIU/ML (ref 0.47–4.68)
WBC # BLD AUTO: 13.1 10^3/UL (ref 4–10.5)

## 2018-12-17 PROCEDURE — 3E0F73Z INTRODUCTION OF ANTI-INFLAMMATORY INTO RESPIRATORY TRACT, VIA NATURAL OR ARTIFICIAL OPENING: ICD-10-PCS | Performed by: FAMILY MEDICINE

## 2018-12-17 RX ADMIN — FAMOTIDINE SCH MG: 20 TABLET, FILM COATED ORAL at 21:35

## 2018-12-17 RX ADMIN — Medication SCH ML: at 05:29

## 2018-12-17 RX ADMIN — DOCUSATE SODIUM SCH: 100 CAPSULE, LIQUID FILLED ORAL at 17:01

## 2018-12-17 RX ADMIN — FAMOTIDINE SCH MG: 20 TABLET, FILM COATED ORAL at 01:28

## 2018-12-17 RX ADMIN — Medication SCH ML: at 01:28

## 2018-12-17 RX ADMIN — LEVALBUTEROL HYDROCHLORIDE SCH MG: 1.25 SOLUTION RESPIRATORY (INHALATION) at 00:53

## 2018-12-17 RX ADMIN — MULTIVITAMIN TABLET SCH TAB: TABLET at 09:02

## 2018-12-17 RX ADMIN — IPRATROPIUM BROMIDE SCH MG: 0.5 SOLUTION RESPIRATORY (INHALATION) at 00:53

## 2018-12-17 RX ADMIN — DOCUSATE SODIUM SCH: 100 CAPSULE, LIQUID FILLED ORAL at 09:03

## 2018-12-17 RX ADMIN — ASPIRIN SCH MG: 81 TABLET, COATED ORAL at 09:02

## 2018-12-17 RX ADMIN — LEVALBUTEROL HYDROCHLORIDE SCH MG: 1.25 SOLUTION RESPIRATORY (INHALATION) at 08:30

## 2018-12-17 RX ADMIN — LEVALBUTEROL HYDROCHLORIDE SCH MG: 1.25 SOLUTION RESPIRATORY (INHALATION) at 16:40

## 2018-12-17 RX ADMIN — Medication SCH: at 13:29

## 2018-12-17 RX ADMIN — BUDESONIDE SCH MG: 0.5 SUSPENSION RESPIRATORY (INHALATION) at 21:29

## 2018-12-17 RX ADMIN — IPRATROPIUM BROMIDE SCH MG: 0.5 SOLUTION RESPIRATORY (INHALATION) at 16:40

## 2018-12-17 RX ADMIN — BUDESONIDE SCH MG: 0.5 SUSPENSION RESPIRATORY (INHALATION) at 08:30

## 2018-12-17 RX ADMIN — ATORVASTATIN CALCIUM SCH MG: 10 TABLET, FILM COATED ORAL at 21:35

## 2018-12-17 RX ADMIN — Medication SCH ML: at 21:34

## 2018-12-17 RX ADMIN — ENOXAPARIN SODIUM SCH: 40 INJECTION SUBCUTANEOUS at 09:02

## 2018-12-17 RX ADMIN — DILTIAZEM HYDROCHLORIDE SCH MG: 180 CAPSULE, EXTENDED RELEASE ORAL at 09:02

## 2018-12-17 RX ADMIN — ATORVASTATIN CALCIUM SCH MG: 10 TABLET, FILM COATED ORAL at 01:28

## 2018-12-17 RX ADMIN — IPRATROPIUM BROMIDE SCH MG: 0.5 SOLUTION RESPIRATORY (INHALATION) at 08:30

## 2018-12-17 RX ADMIN — FAMOTIDINE SCH MG: 20 TABLET, FILM COATED ORAL at 09:02

## 2018-12-17 NOTE — PDOC H&P
History of Present Illness


Admission Date/PCP: 


  12/16/18 21:03





  JOHN TRIMBLE MD





Patient complains of: Chest tightness with palpitations and dyspnea


History of Present Illness: 


LUDWIN RANGEL is a 53 year old male who presented to the emergency room with 

a history of acute onset moderate chest tightness with moderate to severe 

palpitations (rapid heart rate) and moderate dyspnea beginning while he was at 

work ([employer]) just prior to his emergency room presentation.  He denied any 

accompanying symptoms and denied identification of any aggravating or 

ameliorating factors for his chest tightness, palpitations or dyspnea.  He 

admits at least one prior similar episode for which he was given a "metoprolol 

to slow down my heart", which he states he had taken as directed earlier in the 

day prior to going to work.  In the emergency room he was found to be hypoxic, 

hypotensive and tachycardic with atrial fibrillation and a rapid ventricular 

response.  His hypotension was resolved in part with control of his atrial 

fibrillation rate and vascular volume replacement.  His hypoxia also improved 

dramatically with control of his heart rate.  Patient was admitted to inpatient 

status for further evaluation and treatment of his multiple symptoms.





Past Medical History


Cardiac Medical History: Reports: Atrial Fibrillation - ????? He admits a prior 

episode of rapid heartbeat treated with metoprolol., Hyperlipidema - Mildly 

elevated cholesterol significantly elevated triglycerides., Hypertension


Pulmonary Medical History: Reports: Chronic Obstructive Pulmonary Disease (COPD)

, Pneumonia - Posttraumatic pneumonia after multiple rib fractures.


   Denies: Asthma


EENT Medical History: Reports: None


Neurological Medical History: 


   Denies: Hemorrhagic CVA, Ischemic CVA, Seizures


Endocrine Medical History: 


   Denies: Diabetes Mellitus Type 1, Diabetes Mellitus Type 2, Hyperthyroidism, 

Hypothyroidism


Renal/ Medical History: 


   Denies: Chronic Kidney Disease, Nephrolithiasis


Malignancy Medical History: Reports: None


GI Medical History: 


   Denies: Crohn's Disease, Hepatitis, Ulcerative Colitis


Musculoskeltal Medical History: 


   Denies: Arthritis, Gout


Skin Medical History: 


   Denies: Eczema, Psoriasis


Psychiatric Medical History: Reports: Depression, Tobacco Dependency


   Denies: Alcohol Dependency, Substance Abuse


Traumatic Medical History: Reports: None


Hematology: 


   Denies: Anemia, Bleeding Tendencies


Infectious Medical History: Reports: None





Past Surgical History


Past Surgical History: Reports: Other - Left orchiectomy in the remote past.





Social History


Information Source: Patient


Lives with: Alone


Smoking Status: Former Smoker - Patient currently chews tobacco and preference 

to smoking.


Frequency of Alcohol Use: Heavy - Patient drinks 25 ounces of beer daily and 

has done so for the last year or so.  He admits much heavier use of alcohol in 

the past as much as 25 ounces of hard liquor per day on his days off work.  He 

decided to reduce his drinking after he was hospitalized with a posttraumatic 

pneumonia when he sustained rib fractures more than a year ago.


Hx Recreational Drug Use: No


Drugs: None


Hx Prescription Drug Abuse: No





- Advance Directive


Resuscitation Status: Full Code


Surrogate healthcare decision maker:: 


Pavan Rangel





Family History


Family History: DM, Hypertension


Parental Family History Reviewed: Yes


Children Family History Reviewed: No


Sibling(s) Family History Reviewed.: Yes





Medication/Allergy


Home Medications: 








Amlodipine Besylate [Norvasc 5 mg Tablet] 5 mg PO DAILY 01/19/18 


Aspirin [Aspirin EC] 81 mg PO DAILY 01/19/18 


Atenolol [Tenormin 50 mg Tablet] 50 mg PO DAILY 01/19/18 


Atorvastatin Calcium [Lipitor 10 mg Tablet] 10 mg PO QHS 01/19/18 


Budesonide/Formoterol Fumarate [Symbicort Hfa 80-4.5 Mcg Inhaler 6.9 gm] 2 puff 

IH Q12 01/19/18 


Fluoxetine HCl [Prozac] 20 mg PO Q12 01/19/18 


Multivitamin [Tab-A-Lupe (Multiple Vitamin) Tablet] 1 tab PO DAILY 01/19/18 


Metoprolol Tartrate 50 mg PO BID #60 tablet 08/10/18 








Allergies/Adverse Reactions: 


 





chlorpheniramine [From Actifed Cold-Allergy] Allergy (Verified 08/10/18 16:14)


 


phenylephrine [From Actifed Cold-Allergy] Allergy (Verified 08/10/18 16:14)


 


pseudoephedrine [From Actifed Cold-Allergy] Allergy (Verified 08/10/18 16:14)


 


triprolidine [From Actifed Cold-Allergy] Allergy (Verified 08/10/18 16:14)


 











Review of Systems


Constitutional: ABSENT: chills, fever(s)


Eyes: ABSENT: visual disturbances, other - Ocular pain


Ears: ABSENT: hearing changes, other - Ear pain


Nose, Mouth, and Throat: ABSENT: mouth pain, sore throat


Cardiovascular: PRESENT: as per HPI, chest pain - Tightness and pressure, 

dyspnea on exertion, palpitations.  ABSENT: edema, orthropnea


Respiratory: PRESENT: dyspnea.  ABSENT: cough


Gastrointestinal: ABSENT: abdominal pain, constipation, diarrhea, heartburn, 

nausea, vomiting


Genitourinary: ABSENT: dysuria, hematuria


Musculoskeletal: ABSENT: back pain, joint swelling


Integumentary: ABSENT: pruritus, rash


Neurological: ABSENT: confusion, convulsions, memory loss, syncope


Psychiatric: PRESENT: depression.  ABSENT: anxiety


Endocrine: ABSENT: cold intolerance, heat intolerance


Hematologic/Lymphatic: ABSENT: easy bleeding, easy bruising





Physical Exam


Vital Signs: 


 











Temp Pulse Resp BP Pulse Ox


 


 98.5 F      17   125/93 H  93 


 


 12/16/18 19:10     12/16/18 19:56  12/16/18 19:56  12/16/18 19:56











General appearance: PRESENT: no acute distress, cooperative, obese


Head exam: PRESENT: atraumatic, normocephalic


Eye exam: PRESENT: EOMI.  ABSENT: nystagmus, scleral icterus


Ear exam: PRESENT: normal external ear exam.  ABSENT: bleeding


Mouth exam: PRESENT: dry mucosa, neck supple


Neck exam: ABSENT: JVD, thyromegaly, tracheal deviation


Respiratory exam: PRESENT: clear to auscultation judith, symmetrical, unlabored


Cardiovascular exam: PRESENT: irregular rhythm - Irregularly irregular rate and 

rhythm, tachycardia - 140s.  ABSENT: clicks, diastolic murmur, gallop, rubs, 

systolic murmur


Pulses: PRESENT: normal radial pulses, normal dorsalis pedis pul


Vascular exam: PRESENT: normal capillary refill.  ABSENT: pallor


GI/Abdominal exam: PRESENT: normal bowel sounds, soft


Rectal exam: PRESENT: deferred


Extremities exam: ABSENT: joint swelling, pedal edema


Musculoskeletal exam: PRESENT: full ROM, normal inspection


Neurological exam: PRESENT: alert, oriented to person, oriented to place, 

oriented to time, oriented to situation, CN II-XII grossly intact.  ABSENT: 

motor sensory deficit


Psychiatric exam: PRESENT: appropriate affect, normal mood


Skin exam: PRESENT: dry, intact, warm.  ABSENT: jaundice, rash, urticaria





Results


Impressions: 


 





Chest X-Ray  12/16/18 19:17


IMPRESSION:  Emphysema.  Otherwise, no acute radiographic findings in the chest.


 














Assessment & Plan





- Diagnosis


(1) Hypotension


Qualifiers: 


   Hypotension type: unspecified hypotension type   Qualified Code(s): I95.9 - 

Hypotension, unspecified   


Is this a current diagnosis for this admission?: Yes   


Plan: 


Patient will be continued on IV fluids as required for volume replacement and 

careful treatment of his atrial fibrillation with rapid ventricular response to 

not provoke further episodes of hypotension.  Number followed with daily lab 

evaluations of his metabolic profile and magnesium levels.








(2) Acute on chronic respiratory failure with hypoxemia


Is this a current diagnosis for this admission?: Yes   


Plan: 


Patient will be treated with supplemental oxygen as needed.  Considering his 

history of chronic obstructive pulmonary disease oxygen therapy should be 

strictly limited to avoid complications of excess oxygenation.








(3) Atrial fibrillation with rapid ventricular response


Is this a current diagnosis for this admission?: Yes   


Plan: 


Patient is treated with a diltiazem drip after a bolus dose in the emergency 

room.  He will be converted to oral diltiazem overnight and will be observed 

for any recurrence of his rapid ventricular response during the first 12-24 

hours of oral therapy.  Continuous cardiac monitoring will be used to evaluate 

efficacy of therapy.








(4) Tobacco dependence due to chewing tobacco


Is this a current diagnosis for this admission?: Yes   


Plan: 


Patient is advised to discontinue using chewing tobacco and an offer for a 

nicotine patch is made and this will be available should he choose to avail 

himself of this alternative means of avoiding nicotine withdrawal.








(5) Obesity (BMI 30-39.9)


Is this a current diagnosis for this admission?: Yes   


Plan: 


Patient will be seen by dietitian who will be able to make initial 

recommendations and encourage follow-up with his primary provider to make 

lifestyle changes and lose weight for better heart and overall health.








- Time


Time Spent: 30 to 50 Minutes


Critical Time spent with patient: Less than 15 minutes


Medications reviewed and adjusted accordingly: Yes


Anticipated discharge: Home


Within: within 72 hours





- Inpatient Certification


Based on my medical assessment, after consideration of the patient's 

comorbidities, presenting symptoms, or acuity I expect that the services needed 

warrant INPATIENT care.: Yes


I certify that my determination is in accordance with my understanding of 

Medicare's requirements for reasonable and necessary INPATIENT services [42 CFR 

412.3e].: Yes


Medical Necessity: Need Close Monitoring Due to Risk of Patient Decompensation, 

Need For Continuous Telemetry Monitoring, Risk of Complication if Not Cared For 

in Hospital

## 2018-12-17 NOTE — PDOC PROGRESS REPORT
Subjective


Progress Note for:: 12/17/18


Subjective:: 





Patient was admitted with atrial fibrillation with rapid ventricular response.  

Patient has a history of paroxysmal atrial fibrillation.  Patient has been seen 

in my office and is awaiting further evaluation for sleep apnea.  This is 

because of history of paroxysmal atrial fibrillation.  Patient claims that he 

had chest pain when his heart was beating rapid but now it is resolved.


Patient did get Cardizem drip and subsequently p.o. Cardizem.  Currently he is 

in sinus rhythm.


Reason For Visit: 


HYPOTENSION








Physical Exam


Vital Signs: 


 











Temp Pulse Resp BP Pulse Ox


 


 97.5 F   78   20   129/90 H  98 


 


 12/17/18 19:15  12/17/18 20:07  12/17/18 19:15  12/17/18 19:15  12/17/18 19:15








 Intake & Output











 12/16/18 12/17/18 12/18/18





 06:59 06:59 06:59


 


Intake Total  1380 1034


 


Output Total  350 


 


Balance  1030 1034


 


Weight  104.6 kg 











Exam: 








GENERAL: well-nourished and in no acute distress.  Alert and oriented x3


HEAD: Atraumatic, normocephalic.


EYES: REHAN, sclera anicteric, conjunctiva are normal.


ENT: Moist mucous membranes.  No oral ulcerations or bleeding gums noted.  No 

obvious ear, nose or throat abnormalities noted.


NECK: supple without lymphadenopathy.  Trachea is central.  No cervical or 

axillary lymphadenopathy noted.  Carotids are 2+, JVD WNL 


LUNGS: Breath sounds clear bilaterally. No wheezes rales or rhonchi noted.  No 

significant dullness noted on percussion.


CHEST: Palpation of the chest wall shows no significant chest wall tenderness. 


HEART: Beverly NP, No PSH,  1/6 MOSES aortic area, 1/6 pan systolic murmur mitral 

area, no rubs, no gallops.


ABDOMEN: Soft, no significant tenderness appreciated, normoactive bowel sounds. 

No guarding, no rebound.  No rigidity noted . No masses appreciated.


EXTREMITIES: Pedal pulses are 1-2+, no calf tenderness noted. No clubbing or 

cyanosis. negative pedal edema noted


NEUROLOGICAL: Focused neurological exam showed no significant neurologic 

deficit. Normal speech, no focal weakness appreciated. 


PSYCH: Normal mood, normal affect.  Judgment and insight within normal limits.


SKIN: No significant ecchymosis, skin is noted to be warm.


MUSCULOSKELETAL EXAM: No significant acute joint swelling noted.





Results


Laboratory Results: 


 





 12/17/18 01:48 





 12/17/18 01:48 





 











  12/17/18 12/17/18 12/17/18





  01:48 01:48 01:48


 


WBC  13.1 H  


 


RBC  4.72  


 


Hgb  15.7  


 


Hct  46.0  


 


MCV  97  


 


MCH  33.3  


 


MCHC  34.2  


 


RDW  12.9  


 


Plt Count  221  


 


Sodium   144.2 


 


Potassium   3.9  D 


 


Chloride   109 H 


 


Carbon Dioxide   27 


 


Anion Gap   8 


 


BUN   24 H 


 


Creatinine   0.82 


 


Est GFR ( Amer)   > 60 


 


Est GFR (Non-Af Amer)   > 60 


 


Glucose   173 H 


 


Calcium   8.7 


 


Magnesium   2.1 


 


TSH    0.89


 


Free T4    1.45


 


Free T3 pg/mL    4.45








 











  12/17/18 12/17/18 12/17/18





  01:48 01:48 08:00


 


Creatine Kinase  73   62


 


CK-MB (CK-2)   1.31 


 


Troponin I   < 0.012 














  12/17/18





  08:00


 


Creatine Kinase 


 


CK-MB (CK-2)  1.55


 


Troponin I  < 0.012











EKG Comments: 





Atrial fibrillation, no acute ST-T wave changes are noted.


Impressions: 


 





Chest X-Ray  12/16/18 19:17


IMPRESSION:  Emphysema.  Otherwise, no acute radiographic findings in the chest.


 














Assessment & Plan





- Diagnosis


(1) Atrial fibrillation with rapid ventricular response


Is this a current diagnosis for this admission?: Yes   





(2) Hypertension


Qualifiers: 


   Hypertension type: essential hypertension   Qualified Code(s): I10 - 

Essential (primary) hypertension   


Is this a current diagnosis for this admission?: Yes   





- Notes


Notes: 





Paroxysmal atrial fibrillation: Patient currently in sinus rhythm but has 

recurrent spell.  At this point will place him on Multaq 400 mg p.o. twice 

daily.  Continue Cardizem or beta-blocker.  Recommend chronic anticoagulation 

for at least a month.


Hypertension: Currently blood pressure well controlled.


History of tobacco abuse: Continue with Wellbutrin therapy.


Sleep disorder: Patient to be scheduled for sleep study.





- Time


Time with patient: Greater than 35 minutes - CODE STATUS was discussed, patient 

remains full code.  Surrogate decision-maker unchanged.  Multiple medical 

problems were addressed.  More than 50% of the time spent coordinating care, 

discussing management plans with involved caregivers.  Management plans 

discussed with involved personnels.  Medical decision making was of moderate to 

high complexity, patient's has multiple  comorbidities.


Medications reviewed and adjusted accordingly: Yes

## 2018-12-17 NOTE — PDOC PROGRESS REPORT
Subjective


Progress Note for:: 12/17/18


Subjective:: 





She feels a lot better.  He is on Cardizem drip.  His rate is controlled but 

still in A. fib.


Reason For Visit: 


HYPOTENSION








Physical Exam


Vital Signs: 


 











Temp Pulse Resp BP Pulse Ox


 


 97.3 F   87   17   124/81   95 


 


 12/17/18 07:27  12/17/18 08:31  12/17/18 08:31  12/17/18 07:27  12/17/18 08:31








 Intake & Output











 12/16/18 12/17/18 12/18/18





 06:59 06:59 06:59


 


Intake Total  1380 


 


Output Total  350 


 


Balance  1030 


 


Weight  230 lb 9.656 oz 











General appearance: PRESENT: no acute distress, cooperative


Head exam: PRESENT: atraumatic, normocephalic


Eye exam: PRESENT: EOMI.  ABSENT: conjunctival injection


Ear exam: ABSENT: bleeding, drainage


Mouth exam: PRESENT: moist, neck supple


Neck exam: ABSENT: meningismus, tenderness, tracheostomy


Respiratory exam: PRESENT: clear to auscultation judith.  ABSENT: accessory muscle 

use


Cardiovascular exam: PRESENT: irregular rhythm.  ABSENT: diastolic murmur, 

systolic murmur


Pulses: PRESENT: normal carotid pulses


GI/Abdominal exam: PRESENT: normal bowel sounds.  ABSENT: ascites


Rectal exam: PRESENT: deferred


Neurological exam: PRESENT: alert, awake, oriented to person, oriented to place

, oriented to time, oriented to situation


Psychiatric exam: ABSENT: agitated, anxious





Results


Laboratory Results: 


 





 12/17/18 01:48 





 12/17/18 01:48 





 











  12/17/18 12/17/18 12/17/18





  01:48 01:48 01:48


 


WBC  13.1 H  


 


RBC  4.72  


 


Hgb  15.7  


 


Hct  46.0  


 


MCV  97  


 


MCH  33.3  


 


MCHC  34.2  


 


RDW  12.9  


 


Plt Count  221  


 


Sodium   144.2 


 


Potassium   3.9  D 


 


Chloride   109 H 


 


Carbon Dioxide   27 


 


Anion Gap   8 


 


BUN   24 H 


 


Creatinine   0.82 


 


Est GFR ( Amer)   > 60 


 


Est GFR (Non-Af Amer)   > 60 


 


Glucose   173 H 


 


Calcium   8.7 


 


Magnesium   2.1 


 


TSH    0.89


 


Free T4    1.45


 


Free T3 pg/mL    4.45








 











  12/17/18 12/17/18 12/17/18





  01:48 01:48 08:00


 


Creatine Kinase  73   62


 


CK-MB (CK-2)   1.31 


 


Troponin I   < 0.012 














  12/17/18





  08:00


 


Creatine Kinase 


 


CK-MB (CK-2)  1.55


 


Troponin I  < 0.012











Impressions: 


 





Chest X-Ray  12/16/18 19:17


IMPRESSION:  Emphysema.  Otherwise, no acute radiographic findings in the chest.


 














Assessment & Plan





- Diagnosis


(1) Atrial fibrillation with rapid ventricular response


Is this a current diagnosis for this admission?: Yes   


Plan: 


Continue Cardizem drip.  TSH normal.  Check echocardiogram.  Consult cardiology.








(2) Hypotension


Qualifiers: 


   Hypotension type: unspecified hypotension type   Qualified Code(s): I95.9 - 

Hypotension, unspecified   


Is this a current diagnosis for this admission?: Yes   


Plan: 


Resolved.  Likely was due to his A. fib and rapid rate.








(3) Obesity (BMI 30-39.9)


Is this a current diagnosis for this admission?: Yes   


Plan: 


Recommend lifestyle modifications.








(4) Tobacco dependence due to chewing tobacco


Is this a current diagnosis for this admission?: Yes   


Plan: 


Counseled

## 2018-12-17 NOTE — EKG REPORT
SEVERITY:- ABNORMAL ECG -

ATRIAL FIBRILLATION

LEFT AXIS DEVIATION

BORDERLINE T ABNORMALITIES, INFERIOR LEADS

:

Confirmed by: Jose Pedro MD 17-Dec-2018 13:25:08

## 2018-12-17 NOTE — EKG REPORT
SEVERITY:- ABNORMAL ECG -

ATRIAL FIBRILLATION, V-RATE 115-217

PROBABLE INFERIOR INFARCT, OLD

BORDERLINE PROLONGED QT INTERVAL

:

Confirmed by: Savanna Holt MD 17-Dec-2018 00:50:10

## 2018-12-18 LAB
ANION GAP SERPL CALC-SCNC: 6 MMOL/L (ref 5–19)
BUN SERPL-MCNC: 17 MG/DL (ref 7–20)
CALCIUM: 8.9 MG/DL (ref 8.4–10.2)
CHLORIDE SERPL-SCNC: 108 MMOL/L (ref 98–107)
CO2 SERPL-SCNC: 27 MMOL/L (ref 22–30)
ERYTHROCYTE [DISTWIDTH] IN BLOOD BY AUTOMATED COUNT: 13 % (ref 11.5–14)
GLUCOSE SERPL-MCNC: 113 MG/DL (ref 75–110)
HCT VFR BLD CALC: 45.7 % (ref 37.9–51)
HGB BLD-MCNC: 15.5 G/DL (ref 13.5–17)
MCH RBC QN AUTO: 33.2 PG (ref 27–33.4)
MCHC RBC AUTO-ENTMCNC: 34 G/DL (ref 32–36)
MCV RBC AUTO: 98 FL (ref 80–97)
PLATELET # BLD: 212 10^3/UL (ref 150–450)
POTASSIUM SERPL-SCNC: 3.9 MMOL/L (ref 3.6–5)
RBC # BLD AUTO: 4.68 10^6/UL (ref 4.35–5.55)
SODIUM SERPL-SCNC: 141.1 MMOL/L (ref 137–145)
WBC # BLD AUTO: 12.2 10^3/UL (ref 4–10.5)

## 2018-12-18 RX ADMIN — ENOXAPARIN SODIUM SCH MG: 40 INJECTION SUBCUTANEOUS at 09:13

## 2018-12-18 RX ADMIN — LEVALBUTEROL HYDROCHLORIDE SCH MG: 1.25 SOLUTION RESPIRATORY (INHALATION) at 01:25

## 2018-12-18 RX ADMIN — IPRATROPIUM BROMIDE SCH MG: 0.5 SOLUTION RESPIRATORY (INHALATION) at 08:03

## 2018-12-18 RX ADMIN — APIXABAN SCH MG: 5 TABLET, FILM COATED ORAL at 17:12

## 2018-12-18 RX ADMIN — ASPIRIN SCH MG: 81 TABLET, COATED ORAL at 09:13

## 2018-12-18 RX ADMIN — ATORVASTATIN CALCIUM SCH MG: 10 TABLET, FILM COATED ORAL at 21:11

## 2018-12-18 RX ADMIN — Medication SCH: at 13:15

## 2018-12-18 RX ADMIN — BUDESONIDE SCH MG: 0.5 SUSPENSION RESPIRATORY (INHALATION) at 20:55

## 2018-12-18 RX ADMIN — DOCUSATE SODIUM SCH: 100 CAPSULE, LIQUID FILLED ORAL at 17:09

## 2018-12-18 RX ADMIN — FAMOTIDINE SCH MG: 20 TABLET, FILM COATED ORAL at 09:13

## 2018-12-18 RX ADMIN — MULTIVITAMIN TABLET SCH TAB: TABLET at 09:13

## 2018-12-18 RX ADMIN — DOCUSATE SODIUM SCH: 100 CAPSULE, LIQUID FILLED ORAL at 09:14

## 2018-12-18 RX ADMIN — LEVALBUTEROL HYDROCHLORIDE SCH MG: 1.25 SOLUTION RESPIRATORY (INHALATION) at 18:02

## 2018-12-18 RX ADMIN — BUDESONIDE SCH MG: 0.5 SUSPENSION RESPIRATORY (INHALATION) at 08:03

## 2018-12-18 RX ADMIN — FAMOTIDINE SCH MG: 20 TABLET, FILM COATED ORAL at 21:11

## 2018-12-18 RX ADMIN — LEVALBUTEROL HYDROCHLORIDE SCH MG: 1.25 SOLUTION RESPIRATORY (INHALATION) at 08:03

## 2018-12-18 RX ADMIN — Medication SCH ML: at 21:11

## 2018-12-18 RX ADMIN — Medication SCH ML: at 05:05

## 2018-12-18 RX ADMIN — IPRATROPIUM BROMIDE SCH MG: 0.5 SOLUTION RESPIRATORY (INHALATION) at 01:25

## 2018-12-18 RX ADMIN — IPRATROPIUM BROMIDE SCH MG: 0.5 SOLUTION RESPIRATORY (INHALATION) at 18:02

## 2018-12-18 RX ADMIN — DRONEDARONE SCH MG: 400 TABLET, FILM COATED ORAL at 09:13

## 2018-12-18 RX ADMIN — DRONEDARONE SCH MG: 400 TABLET, FILM COATED ORAL at 21:11

## 2018-12-18 RX ADMIN — DILTIAZEM HYDROCHLORIDE SCH MG: 180 CAPSULE, EXTENDED RELEASE ORAL at 09:13

## 2018-12-18 NOTE — XCELERA REPORT
[address] [address]

                               Tel: [phone]

                               Fax: [phone]



                      Transthoracic Echocardiogram Report

_______________________________________________________________________________



Name: LUDWIN SWENSON

MRN: [id number]                           Age: 53 yrs

Gender: Male                              : 1965

Patient Status: Inpatient                 Patient Location: [address] [place] [address]

Account #: [id number]

Study Date: 2018 02:34 PM

Accession #: [id number]

_______________________________________________________________________________





_______________________________________________________________________________

Procedure: A complete two-dimensional transthoracic echocardiogram was

performed (2D, M-mode, spectral and color flow Doppler). The study was

technically adequate with some images being suboptimal in quality.

Reason For Study: AF





Ordering Physician: PABLO EWING

Performed By: Lilia Flores

_______________________________________________________________________________





Interpretation Summary

The left ventricular ejection fraction is normal.

The left ventricle is grossly normal size.

There is mild concentric left ventricular hypertrophy.

Doppler measurements suggest pseudonormalized left ventricular relaxation,

which is associated with grade II/IV or mild to moderate diastolic dysfunction

Wall motion cannot be accurately commented on, but no definite regional wall

motion abnormalities noted.

The right ventricular systolic function is normal.

The left atrium is mildly dilated.

The right atrium is normal in size

There is a trace amount of mitral regurgitation

There is no mitral valve stenosis.

No aortic regurgitation is present.

There is no aortic valve stenosis

There is no tricuspid stenosis.

No tricuspid regurgitation.

The aortic root is not well visualized but is probably normal size.

The inferior vena cava appeared normal and decreased > 50% with respiration

(RAP 5-10 mmHg)

There is no pericardial effusion.



MMode/2D Measurements & Calculations

RVDd: 3.6 cm  LVIDd: 6.1 cm   FS: 20.9 %              Ao root diam: 3.1 cm

IVSd: 1.1 cm  LVIDs: 4.8 cm   EDV(Teich): 186.9 ml    Ao root area: 7.7 cm2

              LVPWd: 0.97 cm  ESV(Teich): 109.0 ml

                              EF(Teich): 41.7 %



Doppler Measurements & Calculations

MV E max erin:       MV dec slope:         Ao V2 max:        LV V1 max P.0 cm/sec                               95.1 cm/sec       2.5 mmHg

MV A max erin:       569.4 cm/sec2         Ao max PG:        LV V1 max:

68.7 cm/sec         MV dec time: 0.17 sec 3.6 mmHg          78.9 cm/sec

MV E/A: 1.4





Left Ventricle

The left ventricle is grossly normal size. There is mild concentric left

ventricular hypertrophy. The left ventricular ejection fraction is normal.

Doppler measurements suggest pseudonormalized left ventricular relaxation,

which is associated with grade II/IV or mild to moderate diastolic

dysfunction. Wall motion cannot be accurately commented on, but no definite

regional wall motion abnormalities noted.



Right Ventricle

The right ventricle is grossly normal size. There is normal right ventricular

wall thickness. The right ventricular systolic function is normal.



Atria

The right atrium is normal in size. The left atrium is mildly dilated.

Interarterial septum not well visualized and not well dopplered. Cannot

comment on ASD/PFO presence.



Mitral Valve

The mitral valve is grossly normal. There is no mitral valve stenosis. There

is a trace amount of mitral regurgitation.





Aortic Valve

The aortic valve is not well visualized secondary to technical limitations.

There is no aortic valve stenosis. No aortic regurgitation is present.



Tricuspid Valve

The tricuspid valve is not well visualized, but is grossly normal. There is no

tricuspid stenosis. No tricuspid regurgitation.



Pulmonic Valve

The pulmonic valve is not well visualized.



Great Vessels

The aortic root is not well visualized but is probably normal size. The

inferior vena cava appeared normal and decreased > 50% with respiration (RAP

5-10 mmHg).





Effusions

There is no pericardial effusion.



_______________________________________________________________________________

_______________________________________________________________________________



Electronically signed by:      Marlin Vasquez      on 2018 12:53 PM



CC: PABLO EWING

>

Marlin Vasquez

## 2018-12-18 NOTE — PDOC PROGRESS REPORT
Subjective


Progress Note for:: 12/18/18


Subjective:: 


Patient is maintaining sinus rhythm. He was placed on antiarrhythmic therapy to 

maintain sinus rhythm. He is also being anti-coagulated.


Patient was admitted with atrial fibrillation with rapid ventricular response.  

Patient has a history of paroxysmal atrial fibrillation.  Patient has been seen 

in my office and is awaiting further evaluation for sleep apnea.  This is 

because of history of paroxysmal atrial fibrillation.  Patient claims that he 

had chest pain when his heart was beating rapid but now it is resolved.


Patient did get Cardizem drip and subsequently p.o. Cardizem.  Currently he is 

in sinus rhythm.


Reason For Visit: 


HYPOTENSION








Physical Exam


Vital Signs: 


 











Temp Pulse Resp BP Pulse Ox


 


 97.6 F   89   20   129/75 H  92 


 


 12/18/18 19:24  12/18/18 19:58  12/18/18 19:24  12/18/18 19:24  12/18/18 19:24








 Intake & Output











 12/17/18 12/18/18 12/19/18





 06:59 06:59 06:59


 


Intake Total 1380 1256 888


 


Output Total 350  


 


Balance 1030 1256 888


 


Weight 104.6 kg 105 kg 











Exam: 





GEN: NAD, patient alert oriented x3. Appearance and grooming WNL 


HEENT : Eyes: REHAN, Ears: No significant abnormalities, Nose: No significant 

abnormalities. normocephalic atraumatic. Flat midface (-), Receding chin (-) 


ORAL : Mallampati class IV, narrow arched palate (-) Tonsils: Not enlarged.


NECK: no thyromegaly, no masses, trachea is central, JVD is not elevated, 

carotids 2+ with bruit (-)


RESP: lungs clear, no rales, wheezes or rhonchi, nonlabored, accessory muscles 

of respiration use (-).


CV: NL S1 and S2. No significant murmurs noted, no gallop, no extra sounds, no 

clicks, no rub noted.


GI: abd NT to palpation, no masses, bowel sounds present, no guarding or 

rigidity noted.


EXT: no clubbing, (-) cyanosis, edema (-), perpheral pulses diminished (no)


MUSC/SKEL: no acute joint swelling noted. Muscle strength is generally intact.


NEURO:  no significant focal neurological deficits are note, sensation grossly 

intact, AO x 3


PSYCH: NL mood and affect. judgment and insight noted to be intact.


SKIN: (-) rash, (-)Signs of pruritus, (-) other significant abnormality




















Results


Laboratory Results: 


 





 12/18/18 06:16 





 12/18/18 06:16 





 











  12/18/18 12/18/18





  06:16 06:16


 


WBC  12.2 H 


 


RBC  4.68 


 


Hgb  15.5 


 


Hct  45.7 


 


MCV  98 H 


 


MCH  33.2 


 


MCHC  34.0 


 


RDW  13.0 


 


Plt Count  212 


 


Sodium   141.1


 


Potassium   3.9


 


Chloride   108 H


 


Carbon Dioxide   27


 


Anion Gap   6


 


BUN   17


 


Creatinine   0.83


 


Est GFR ( Amer)   > 60


 


Est GFR (Non-Af Amer)   > 60


 


Glucose   113 H


 


Calcium   8.9


 


Magnesium   2.2








 











  12/17/18 12/17/18 12/17/18





  01:48 01:48 08:00


 


Creatine Kinase  73   62


 


CK-MB (CK-2)   1.31 


 


Troponin I   < 0.012 














  12/17/18





  08:00


 


Creatine Kinase 


 


CK-MB (CK-2)  1.55


 


Troponin I  < 0.012











Impressions: 


 





Chest X-Ray  12/16/18 19:17


IMPRESSION:  Emphysema.  Otherwise, no acute radiographic findings in the chest.


 














Assessment & Plan





- Diagnosis


(1) Atrial fibrillation with rapid ventricular response


Is this a current diagnosis for this admission?: Yes   





(2) Hypertension


Qualifiers: 


   Hypertension type: essential hypertension   Qualified Code(s): I10 - 

Essential (primary) hypertension   


Is this a current diagnosis for this admission?: Yes   





- Notes


Notes: 





Patient remains stable and is maintaining sinus rhythm.


Blood-pressure is well controlled.


Continue with current management plans. Patient will benefit from close 

cardiology follow-up on discharge. Recommend chronic anticoagulation for at 

least a month if not longer. Will also recommend antiarrhythmic therapy for at 

least a month.





- Time


Time with patient: Greater than 35 minutes


Medications reviewed and adjusted accordingly: Yes

## 2018-12-18 NOTE — PDOC PROGRESS REPORT
Subjective


Progress Note for:: 12/18/18


Subjective:: 


Mr. Rangel is a 53-year-old male with a history of paroxysmal A. fib who was 

admitted for atrial fibrillation with rapid ventricular response.





Patient was started on Cardizem drip initially and he subsequently converted to 

sinus rhythm and was switched to PO cardizem.





No acute event overnight.  Patient says that he feels better.  Denies chest pain

or shortness of breath.  No palpitations.  Patient currently only on 

prophylactic Lovenox.


Reason For Visit: 


HYPOTENSION








Physical Exam


Vital Signs: 


                                        











Temp Pulse Resp BP Pulse Ox


 


 97.3 F   82   18   149/84 H  93 


 


 12/18/18 12:03  12/18/18 14:00  12/18/18 12:03  12/18/18 12:03  12/18/18 12:03








                                 Intake & Output











 12/17/18 12/18/18 12/19/18





 06:59 06:59 06:59


 


Intake Total 1380 1256 


 


Output Total 350  


 


Balance 1030 1256 


 


Weight 230 lb 9.656 oz 231 lb 7.766 oz 











General appearance: PRESENT: no acute distress, well-developed, well-nourished


Head exam: PRESENT: atraumatic, normocephalic


Eye exam: PRESENT: conjunctiva pink, EOMI, PERRLA.  ABSENT: scleral icterus


Ear exam: PRESENT: normal external ear exam


Mouth exam: PRESENT: moist, tongue midline


Neck exam: ABSENT: carotid bruit, JVD, lymphadenopathy, thyromegaly


Respiratory exam: PRESENT: clear to auscultation judith.  ABSENT: rales, rhonchi, 

wheezes


Cardiovascular exam: PRESENT: RRR.  ABSENT: diastolic murmur, rubs, systolic 

murmur


Pulses: PRESENT: normal dorsalis pedis pul


GI/Abdominal exam: PRESENT: normal bowel sounds, soft.  ABSENT: distended, 

guarding, mass, organolmegaly, rebound, tenderness


Rectal exam: PRESENT: deferred


Neurological exam: PRESENT: alert, awake, oriented to person, oriented to place,

oriented to time, oriented to situation, CN II-XII grossly intact.  ABSENT: 

motor sensory deficit





Results


Laboratory Results: 


                                        





                                 12/18/18 06:16 





                                 12/18/18 06:16 





                                        











  12/18/18 12/18/18





  06:16 06:16


 


WBC  12.2 H 


 


RBC  4.68 


 


Hgb  15.5 


 


Hct  45.7 


 


MCV  98 H 


 


MCH  33.2 


 


MCHC  34.0 


 


RDW  13.0 


 


Plt Count  212 


 


Sodium   141.1


 


Potassium   3.9


 


Chloride   108 H


 


Carbon Dioxide   27


 


Anion Gap   6


 


BUN   17


 


Creatinine   0.83


 


Est GFR ( Amer)   > 60


 


Est GFR (Non-Af Amer)   > 60


 


Glucose   113 H


 


Calcium   8.9


 


Magnesium   2.2








                                        











  12/17/18 12/17/18 12/17/18





  01:48 01:48 08:00


 


Creatine Kinase  73   62


 


CK-MB (CK-2)   1.31 


 


Troponin I   < 0.012 














  12/17/18





  08:00


 


Creatine Kinase 


 


CK-MB (CK-2)  1.55


 


Troponin I  < 0.012











Impressions: 


                                        





Chest X-Ray  12/16/18 19:17


IMPRESSION:  Emphysema.  Otherwise, no acute radiographic findings in the chest.


 














Assessment & Plan





- Diagnosis


(1) Atrial fibrillation with rapid ventricular response


Is this a current diagnosis for this admission?: Yes   


Plan: 


Currently in sinus rhythm with a heart rate of 80.  Patient has been switched to

oral Cardizem.  Chads vas score of 1.  Cardiology does recommend anticoagulation

for at least a month.  Discussed benefits and risk of.  Will discontinue 

prophylactic Lovenox and will start patient on Eliquis today.








- Time


Time Spent with patient: 15-24 minutes

## 2018-12-19 VITALS — SYSTOLIC BLOOD PRESSURE: 119 MMHG | DIASTOLIC BLOOD PRESSURE: 65 MMHG

## 2018-12-19 LAB
ANION GAP SERPL CALC-SCNC: 11 MMOL/L (ref 5–19)
BUN SERPL-MCNC: 16 MG/DL (ref 7–20)
CALCIUM: 9.4 MG/DL (ref 8.4–10.2)
CHLORIDE SERPL-SCNC: 106 MMOL/L (ref 98–107)
CO2 SERPL-SCNC: 23 MMOL/L (ref 22–30)
ERYTHROCYTE [DISTWIDTH] IN BLOOD BY AUTOMATED COUNT: 12.7 % (ref 11.5–14)
GLUCOSE SERPL-MCNC: 111 MG/DL (ref 75–110)
HCT VFR BLD CALC: 50.5 % (ref 37.9–51)
HGB BLD-MCNC: 17.2 G/DL (ref 13.5–17)
MCH RBC QN AUTO: 33.5 PG (ref 27–33.4)
MCHC RBC AUTO-ENTMCNC: 34 G/DL (ref 32–36)
MCV RBC AUTO: 99 FL (ref 80–97)
PLATELET # BLD: 235 10^3/UL (ref 150–450)
POTASSIUM SERPL-SCNC: 3.9 MMOL/L (ref 3.6–5)
RBC # BLD AUTO: 5.13 10^6/UL (ref 4.35–5.55)
SODIUM SERPL-SCNC: 139.7 MMOL/L (ref 137–145)
WBC # BLD AUTO: 10.4 10^3/UL (ref 4–10.5)

## 2018-12-19 RX ADMIN — LEVALBUTEROL HYDROCHLORIDE SCH MG: 1.25 SOLUTION RESPIRATORY (INHALATION) at 09:50

## 2018-12-19 RX ADMIN — Medication SCH ML: at 06:45

## 2018-12-19 RX ADMIN — IPRATROPIUM BROMIDE SCH MG: 0.5 SOLUTION RESPIRATORY (INHALATION) at 09:50

## 2018-12-19 RX ADMIN — APIXABAN SCH MG: 5 TABLET, FILM COATED ORAL at 09:24

## 2018-12-19 RX ADMIN — FAMOTIDINE SCH MG: 20 TABLET, FILM COATED ORAL at 09:24

## 2018-12-19 RX ADMIN — DRONEDARONE SCH MG: 400 TABLET, FILM COATED ORAL at 09:24

## 2018-12-19 RX ADMIN — LEVALBUTEROL HYDROCHLORIDE SCH MG: 1.25 SOLUTION RESPIRATORY (INHALATION) at 00:58

## 2018-12-19 RX ADMIN — DILTIAZEM HYDROCHLORIDE SCH MG: 180 CAPSULE, EXTENDED RELEASE ORAL at 09:24

## 2018-12-19 RX ADMIN — BUDESONIDE SCH MG: 0.5 SUSPENSION RESPIRATORY (INHALATION) at 09:51

## 2018-12-19 RX ADMIN — DOCUSATE SODIUM SCH: 100 CAPSULE, LIQUID FILLED ORAL at 09:17

## 2018-12-19 RX ADMIN — IPRATROPIUM BROMIDE SCH MG: 0.5 SOLUTION RESPIRATORY (INHALATION) at 00:58

## 2018-12-19 RX ADMIN — ASPIRIN SCH MG: 81 TABLET, COATED ORAL at 09:24

## 2018-12-19 RX ADMIN — MULTIVITAMIN TABLET SCH TAB: TABLET at 09:24

## 2018-12-19 NOTE — PDOC DISCHARGE SUMMARY
General





- Admit/Disc Date/PCP


Admission Date/Primary Care Provider: 


  12/16/18 21:03





  JOHN TRIMBLE MD





Discharge Date: 12/19/18





- Discharge Diagnosis


(1) Atrial fibrillation with rapid ventricular response


Is this a current diagnosis for this admission?: Yes   





- Additional Information


Resuscitation Status: Full Code


Discharge Diet: Cardiac


Discharge Activity: Activity As Tolerated


Prescriptions: 


Amiodarone HCl [Amiodarone HCl 400 mg Tablet] 200 mg PO Q12 #30 tablet


Apixaban [Eliquis 5 mg Tablet] 5 mg PO BID #60 tablet


Diltiazem HCl [Cardizem Cd 180 mg Capsule] 180 mg PO BID #60 capsule.cr


Home Medications: 








Aspirin [Aspirin EC] 81 mg PO DAILY 01/19/18 


Atorvastatin Calcium [Lipitor 10 mg Tablet] 10 mg PO QHS 01/19/18 


Multivitamin [Tab-A-Lupe (Multiple Vitamin) Tablet] 1 tab PO DAILY 01/19/18 


Amlodipine Besylate [Norvasc 10 mg Tablet] 10 mg PO DAILY 12/17/18 


Budesonide/Formoterol Fumarate [Symbicort -4.5 mcg Inhaler 6 gm] 2 puff 

IH Q12 12/17/18 


Bupropion HCl [Wellbutrin Xl 300mg 24hr Tablet] 300 mg PO DAILY 12/17/18 


Amiodarone HCl [Amiodarone HCl 400 mg Tablet] 200 mg PO Q12 #30 tablet 12/19/18 


Apixaban [Eliquis 5 mg Tablet] 5 mg PO BID #60 tablet 12/19/18 


Diltiazem HCl [Cardizem Cd 180 mg Capsule] 180 mg PO BID #60 capsule.cr 12/19/18













History of Present Illness


History of Present Illness: 


Admitting hospitalist's H&P:





LUDWIN SWENSON is a 53 year old male who presented to the emergency room with a

history of acute onset moderate chest tightness with moderate to severe 

palpitations (rapid heart rate) and moderate dyspnea beginning while he was at 

work ([employer]) just prior to his emergency room presentation.  He denied any 

accompanying symptoms and denied identification of any aggravating or 

ameliorating factors for his chest tightness, palpitations or dyspnea.  He 

admits at least one prior similar episode for which he was given a "metoprolol 

to slow down my heart", which he states he had taken as directed earlier in the 

day prior to going to work.  In the emergency room he was found to be hypoxic, 

hypotensive and tachycardic with atrial fibrillation and a rapid ventricular 

response.  His hypotension was resolved in part with control of his atrial 

fibrillation rate and vascular volume replacement.  His hypoxia also improved 

dramatically with control of his heart rate.  Patient was admitted to inpatient 

status for further evaluation and treatment of his multiple symptoms.








Hospital Course


Hospital Course: 


Mr. Swenson is a 53-year-old male with a history of paroxysmal A. fib and 

hypertension who was admitted for atrial fibrillation with rapid ventricular 

response. 





Patient was started on Cardizem drip initially and he subsequently converted to 

sinus rhythm. He was then switched to PO cardizem. Cardiology also evaluated felicita barajas. Patient was started on Eliquis. Risks and benefits of anticoagulation 

discussed.





He has been in sinus rhythm since conversion to sinus on day of presentation. e 

does follow Dr. Vasquez and has been scheduled for sleep study for evaluation of 

REGLA. He will be discharge on PO cardizem and Eliquis and will follow up with 

cardio in a week.











Physical Exam


Vital Signs: 


                                        











Temp Pulse Resp BP Pulse Ox


 


 97.7 F   85   18   119/65   92 


 


 12/19/18 11:16  12/19/18 11:16  12/19/18 11:16  12/19/18 11:16  12/19/18 11:16








                                 Intake & Output











 12/18/18 12/19/18 12/20/18





 06:59 06:59 06:59


 


Intake Total 1256 1728 


 


Output Total  0 


 


Balance 1256 1728 


 


Weight 231 lb 7.766 oz 230 lb 9.656 oz 











General appearance: PRESENT: no acute distress, well-developed, well-nourished


Head exam: PRESENT: atraumatic, normocephalic


Eye exam: PRESENT: conjunctiva pink, EOMI, PERRLA.  ABSENT: scleral icterus


Ear exam: PRESENT: normal external ear exam


Neck exam: ABSENT: carotid bruit, JVD, lymphadenopathy, thyromegaly


Respiratory exam: PRESENT: clear to auscultation judith.  ABSENT: rales, rhonchi, 

wheezes


Cardiovascular exam: PRESENT: RRR.  ABSENT: diastolic murmur, rubs, systolic 

murmur


Pulses: PRESENT: normal dorsalis pedis pul


GI/Abdominal exam: PRESENT: normal bowel sounds, soft.  ABSENT: distended, guar

ding, mass, organolmegaly, rebound, tenderness


Rectal exam: PRESENT: deferred


Extremities exam: PRESENT: full ROM.  ABSENT: calf tenderness, clubbing, pedal 

edema


Neurological exam: PRESENT: alert, awake, oriented to person, oriented to place,

oriented to time, oriented to situation, CN II-XII grossly intact.  ABSENT: 

motor sensory deficit





Results


Laboratory Results: 


                                        





                                 12/19/18 06:59 





                                 12/19/18 06:59 





                                        











  12/19/18 12/19/18





  06:59 06:59


 


WBC  10.4 


 


RBC  5.13 


 


Hgb  17.2 H 


 


Hct  50.5 


 


MCV  99 H 


 


MCH  33.5 H 


 


MCHC  34.0 


 


RDW  12.7 


 


Plt Count  235 


 


Sodium   139.7


 


Potassium   3.9


 


Chloride   106


 


Carbon Dioxide   23


 


Anion Gap   11


 


BUN   16


 


Creatinine   0.71


 


Est GFR ( Amer)   > 60


 


Est GFR (Non-Af Amer)   > 60


 


Glucose   111 H


 


Calcium   9.4


 


Magnesium   2.1








                                        











  12/16/18 12/16/18 12/16/18





  19:29 19:29 19:29


 


Creatine Kinase   89 


 


CK-MB (CK-2)    1.61


 


Troponin I  < 0.012   Cancelled














  12/17/18 12/17/18 12/17/18





  01:48 01:48 08:00


 


Creatine Kinase  73   62


 


CK-MB (CK-2)   1.31 


 


Troponin I   < 0.012 














  12/17/18





  08:00


 


Creatine Kinase 


 


CK-MB (CK-2)  1.55


 


Troponin I  < 0.012











Impressions: 


                                        





Chest X-Ray  12/16/18 19:17


IMPRESSION:  Emphysema.  Otherwise, no acute radiographic findings in the chest.


 














Qualifiers





- *


PATIENT BEING DISCHARGED WITH ANY OF THE FOLLOWING DIAGNOSIS: No

## 2019-09-09 ENCOUNTER — HOSPITAL ENCOUNTER (EMERGENCY)
Dept: HOSPITAL 62 - ER | Age: 54
Discharge: HOME | End: 2019-09-09
Payer: COMMERCIAL

## 2019-09-09 VITALS — DIASTOLIC BLOOD PRESSURE: 98 MMHG | SYSTOLIC BLOOD PRESSURE: 162 MMHG

## 2019-09-09 DIAGNOSIS — J44.9: ICD-10-CM

## 2019-09-09 DIAGNOSIS — Z90.79: ICD-10-CM

## 2019-09-09 DIAGNOSIS — E78.00: ICD-10-CM

## 2019-09-09 DIAGNOSIS — R07.81: Primary | ICD-10-CM

## 2019-09-09 DIAGNOSIS — I48.91: ICD-10-CM

## 2019-09-09 DIAGNOSIS — Z79.01: ICD-10-CM

## 2019-09-09 PROCEDURE — 71250 CT THORAX DX C-: CPT

## 2019-09-09 PROCEDURE — 99283 EMERGENCY DEPT VISIT LOW MDM: CPT

## 2019-09-09 NOTE — ER DOCUMENT REPORT
ED General Pain





- General


Chief Complaint: Rib Pain


Stated Complaint: RIB PAIN


Time Seen by Provider: 09/09/19 08:27


Primary Care Provider: 


JOHN TRIMBLE MD [Primary Care Provider] - Follow up as needed


Notes: 





Chief complaint: Right rib pain








History of complain:( obtained from----patient) 54 years old male with a history

of right rib fracture, COPD, atrial fibrillation currently on Eliquis, presents 

today with right lower chest wall pain.  He developed pain 3 days ago after 

having a hard cough.  Since then each time he moves the pain is increased over 

the right lower chest wall.  No fever chills no productive cough.  No other 

constitutional symptoms








Onset: As above sudden


Duration: 3 days


Severity: Moderate to severe


Quality: Sharp


Context: As described above


Exacerbating factor and relieving factors: As above











REVIEW OF SYSTEMS:


CONSTITUTIONAL :  Denies fever,  chills, or sweats.  Denies recent illness.


EENT:   Denies eye, ear, throat, or mouth pain or symptoms.  Denies nasal or 

sinus congestion or discharge.  Denies throat, tongue, or mouth swelling or 

difficulty swallowing.


CARDIOVASCULAR:  Denies chest pain.  Denies palpitations or racing or irregular 

heart beat.  Denies ankle edema.


RESPIRATORY:  Denies cough, cold, or chest congestion.  Denies shortness of 

breath, difficulty breathing, or wheezing.


GASTROINTESTINAL:  Denies  distention.  Denies nausea, vomiting, or diarrhea.  D

enies blood in vomitus, stools, or per rectum.  Denies black, tarry stools.  

Denies constipation. 


GENITOURINARY:  Denies difficulty urinating, painful urination, burning, 

frequency, blood in urine, or discharge.


FEMALE  GENITOURINARY:  Denies vaginal bleeding, heavy or abnormal periods, 

irregular periods.  Denies vaginal discharge or odor. 


MUSCULOSKELETAL:  Denies back or neck pain or stiffness.  Denies joint pain or 

swelling.


SKIN:   Denies rash, lesions or sores.


HEMATOLOGIC :   Denies easy bruising or bleeding.


LYMPHATIC:  Denies swollen, enlarged glands.


NEUROLOGICAL:  Denies confusion or altered mental status.  Denies passing out or

loss of consciousness.  Denies dizziness or lightheadedness.  Denies headache.  

Denies weakness or paralysis or loss of use of either side.  Denies problems 

with gait or speech.  Denies sensory loss, numbness, or tingling.  Denies 

seizures.


PSYCHIATRIC:  Denies anxiety or stress.  Denies depression, suicidal ideation, 

or homicidal ideation.





ALL OTHER SYSTEMS REVIEWED AND NEGATIVE.











PHYSICAL EXAMINATION:





GENERAL: Well-appearing, well-nourished and in mild to moderate acute distress.





HEAD: Atraumatic, normocephalic.





EYES: Pupils equal round and reactive to light, extraocular movements intact, 

conjunctiva are normal.





ENT: Nares patent, oropharynx clear without exudates.  Moist mucous membranes.





NECK: Normal range of motion, supple without lymphadenopathy





LUNGS: Breath sounds clear to auscultation bilaterally and equal.  No wheezes 

rales or rhonchi.


Right lower chest wall tenderness noted on palpation





HEART: Regular rate and rhythm without murmurs





ABDOMEN: Soft, nontender, nondistended abdomen.  No guarding, no rebound.  No 

masses appreciated.





Examination of genitals-deferred





Musculoskeletal: Normal range of motion, no pitting or edema.  No cyanosis.





NEUROLOGICAL: Cranial nerves grossly intact.  Normal speech, normal gait.  

Normal sensory, motor exams





PSYCH: Normal mood, normal affect.





SKIN: Warm, Dry, normal turgor, no rashes or lesions noted.

















Dictation was performed using Dragon voice recognition software


TRAVEL OUTSIDE OF THE [place] IN LAST 30 DAYS: No





- HPI


Patient complains to provider of: Dictated





- Related Data


Allergies/Adverse Reactions: 


                                        





chlorpheniramine [From Actifed Cold-Allergy] Allergy (Verified 12/17/18 11:17)


   


phenylephrine [From Actifed Cold-Allergy] Allergy (Verified 12/17/18 11:17)


   


pseudoephedrine [From Actifed Cold-Allergy] Allergy (Verified 12/17/18 11:17)


   


triprolidine [From Actifed Cold-Allergy] Allergy (Verified 12/17/18 11:17)


   











Past Medical History





- General


Information source: Patient





- Social History


Smoking Status: Former Smoker


Cigarette use (# per day): No


Chew tobacco use (# tins/day): No


Frequency of alcohol use: Rare


Lives with: Family


Family History: DM, Hypertension


Patient has suicidal ideation: No


Patient has homicidal ideation: No





- Past Medical History


Cardiac Medical History: Reports: Hx Atrial Fibrillation - ????? He admits a 

prior episode of rapid heartbeat treated with metoprolol., Hx 

Hypercholesterolemia - Mildly elevated cholesterol significantly elevated 

triglycerides., Hx Hypertension


Pulmonary Medical History: Reports: Hx COPD, Hx Pneumonia - Posttraumatic 

pneumonia after multiple rib fractures.


   Denies: Hx Asthma


Neurological Medical History: Denies: Hx Seizures


Endocrine Medical History: Denies: Hx Diabetes Mellitus Type 1, Hx Diabetes 

Mellitus Type 2, Hx Hyperthyroidism, Hx Hypothyroidism


Renal/ Medical History: Denies: Hx Peritoneal Dialysis


GI Medical History: Denies: Hx Crohn's Disease, Hx Hepatitis, Hx Ulcerative 

Colitis


Musculoskeletal Medical History: Denies Hx Arthritis, Denies Hx Gout


Skin Medical History: Denies Hx Eczema, Denies Hx Psoriasis


Psychiatric Medical History: Reports: Hx Depression


Traumatic Medical History: Denies: Hx Traumatic Brain Injury


Infectious Medical History: Denies: Hx Hepatitis


Past Surgical History: Reports: Hx Testicular Surgery - Orchiectomy, Other - 

Left orchiectomy in the remote past.





Review of Systems





- Review of Systems


Notes: 





Dictated





Physical Exam





- Vital signs


Vitals: 


                                        











Temp Pulse Resp BP Pulse Ox


 


 97.8 F   78   20   179/104 H  93 


 


 09/09/19 07:48  09/09/19 07:48  09/09/19 07:48  09/09/19 07:48  09/09/19 07:48














- Notes


Notes: 





Dictated





Course





- Vital Signs


Vital signs: 


                                        











Temp Pulse Resp BP Pulse Ox


 


 97.8 F   78   20   179/104 H  93 


 


 09/09/19 07:48  09/09/19 07:48  09/09/19 07:48  09/09/19 07:48  09/09/19 07:48














- Diagnostic Test


Radiology reviewed: Reports reviewed - CT showed no acute fracture, bullous 

lesions in the lungs due to COPD





Discharge





- Discharge


Clinical Impression: 


 Right-sided chest wall pain





Condition: Fair


Disposition: HOME, SELF-CARE


Instructions:  Chest Wall Pain ([facility])


Prescriptions: 


Baclofen [Baclofen 10 mg Tablet] 10 mg PO TID #30 tablet


Hydrocodone/Acetaminophen [Norco 5-325 mg Tablet] 1 tab PO TID #12 tablet


Referrals: 


JOHN TRIMBLE MD [Primary Care Provider] - Follow up as needed

## 2019-09-09 NOTE — RADIOLOGY REPORT (SQ)
EXAM DESCRIPTION:  CT CHEST WITHOUT



COMPLETED DATE/TIME:  9/9/2019 9:06 am



REASON FOR STUDY:  Right lower rib pain/spontaneous injury



COMPARISON:  CT chest, 1/18/2018



TECHNIQUE:  CT scan performed of the chest without intravenous contrast.  Images reviewed with lung, 
soft tissue and bone windows.  Reconstructed coronal and sagittal MPR images reviewed.  All images st
ored on PACS.

All CT scanners at this facility use dose modulation, iterative reconstruction, and/or weight based d
osing when appropriate to reduce radiation dose to as low as reasonably achievable (ALARA).

[facility]: Dose Right  [facility]: CareDose    [facility]: Dose Right    [facility]: Teradose 4D    [facility]: Smart Technologies



RADIATION DOSE:  CT Rad equipment meets quality standard of care and radiation dose reduction techniq
ues were employed. CTDIvol: 18.4 mGy. DLP: 786 mGy-cm. mGy.



LIMITATIONS:  No technical limitations.



FINDINGS:  LUNGS AND PLEURA: Centrilobular emphysema, which is very severe and bullous in the left pu
lmonary apex.  Bibasilar scarring and/or atelectasis.

HILAR AND MEDIASTINAL STRUCTURES: No identified masses or abnormal nodes.  No obvious aneurysm.

HEART AND VASCULAR STRUCTURES: No aneurysm.  No pericardial effusion.  Three-vessel coronary artery c
alcifications.

UPPER ABDOMEN: No significant findings.  Limited exam.

THYROID AND OTHER SOFT TISSUES: No masses.  No adenopathy.

BONES: No acute fractures.  There are multiple nonacute callused fractures of the right posterior and
 lateral ribs.

HARDWARE: None in the chest.

OTHER: No other significant findings.



IMPRESSION:  1. Centrilobular emphysema, which is very severe and bullous in the left pulmonary apex.
 Bibasilar scarring and/or atelectasis.

2. No acute fractures.  There are multiple nonacute callused fractures of the right posterior and lat
eral ribs.

3.  Coronary artery disease.



TECHNICAL DOCUMENTATION:  JOB ID:  [id number]

Quality ID # 436: Final reports with documentation of one or more dose reduction techniques (e.g., Au
tomated exposure control, adjustment of the mA and/or kV according to patient size, use of iterative 
reconstruction technique)

 2011 [employer]- All Rights Reserved



Reading location - IP/workstation name: [email or web address]

## 2020-09-24 LAB
ANION GAP SERPL CALC-SCNC: 9 MMOL/L (ref 5–19)
APPEARANCE UR: (no result)
APTT PPP: YELLOW S
BILIRUB UR QL STRIP: NEGATIVE
BUN SERPL-MCNC: 29 MG/DL (ref 7–20)
CALCIUM: 9.7 MG/DL (ref 8.4–10.2)
CHLORIDE SERPL-SCNC: 103 MMOL/L (ref 98–107)
CO2 SERPL-SCNC: 29 MMOL/L (ref 22–30)
ERYTHROCYTE [DISTWIDTH] IN BLOOD BY AUTOMATED COUNT: 13.9 % (ref 11.5–14)
GLUCOSE SERPL-MCNC: 111 MG/DL (ref 75–110)
GLUCOSE UR STRIP-MCNC: NEGATIVE MG/DL
HCT VFR BLD CALC: 52 % (ref 37.9–51)
HGB BLD-MCNC: 18.1 G/DL (ref 13.5–17)
KETONES UR STRIP-MCNC: NEGATIVE MG/DL
MCH RBC QN AUTO: 34.5 PG (ref 27–33.4)
MCHC RBC AUTO-ENTMCNC: 34.7 G/DL (ref 32–36)
MCV RBC AUTO: 99 FL (ref 80–97)
NITRITE UR QL STRIP: NEGATIVE
PH UR STRIP: 5 [PH] (ref 5–9)
PLATELET # BLD: 219 10^3/UL (ref 150–450)
POTASSIUM SERPL-SCNC: 4 MMOL/L (ref 3.6–5)
PROT UR STRIP-MCNC: NEGATIVE MG/DL
RBC # BLD AUTO: 5.24 10^6/UL (ref 4.35–5.55)
SP GR UR STRIP: 1.02
UROBILINOGEN UR-MCNC: NEGATIVE MG/DL (ref ?–2)
WBC # BLD AUTO: 11 10^3/UL (ref 4–10.5)

## 2020-09-24 NOTE — EKG REPORT
SEVERITY:- ABNORMAL ECG -

SINUS RHYTHM

NONSPECIFIC IVCD WITH LAD

PROBABLE INFERIOR INFARCT, OLD

:

Confirmed by: Savanna Holt MD 24-Sep-2020 22:02:01

## 2020-09-24 NOTE — RADIOLOGY REPORT (SQ)
EXAM DESCRIPTION:  CHEST PA/LATERAL



IMAGES COMPLETED DATE/TIME:  9/24/2020 1:44 pm



REASON FOR STUDY:  PRE-OP



COMPARISON:  12/16/2018



EXAM PARAMETERS:  NUMBER OF VIEWS: two views

TECHNIQUE: Digital Frontal and Lateral radiographic views of the chest acquired.

RADIATION DOSE: NA

LIMITATIONS: none



FINDINGS:  LUNGS AND PLEURA: Chronic changes in the lateral right lung.  No acute infiltrate.  There 
is considerable lucency in the left apex.  Chronic interstitial changes.

MEDIASTINUM AND HILAR STRUCTURES: No masses or contour abnormalities.

HEART AND VASCULAR STRUCTURES: The heart size is borderline.  No pulmonary edema.

BONES: No acute findings.

HARDWARE: None in the chest.

OTHER: No other significant finding.



IMPRESSION:  Borderline cardiomegaly without pulmonary edema.  Chronic lung changes.  No acute pulmon
isi findings.



TECHNICAL DOCUMENTATION:  JOB ID:  [id number]

 2011 [employer]- All Rights Reserved



Reading location - IP/workstation name: [email or web address]

## 2020-09-24 NOTE — EKG REPORT
SEVERITY:- ABNORMAL ECG -

SINUS RHYTHM

NONSPECIFIC IVCD WITH LAD

:

Confirmed by: Savanna Holt MD 24-Sep-2020 22:01:57

## 2020-09-29 ENCOUNTER — HOSPITAL ENCOUNTER (OUTPATIENT)
Dept: HOSPITAL 62 - OROUT | Age: 55
Discharge: HOME | End: 2020-09-29
Attending: ORTHOPAEDIC SURGERY
Payer: COMMERCIAL

## 2020-09-29 VITALS — SYSTOLIC BLOOD PRESSURE: 124 MMHG | DIASTOLIC BLOOD PRESSURE: 71 MMHG

## 2020-09-29 DIAGNOSIS — F17.210: ICD-10-CM

## 2020-09-29 DIAGNOSIS — G56.02: Primary | ICD-10-CM

## 2020-09-29 DIAGNOSIS — F32.9: ICD-10-CM

## 2020-09-29 DIAGNOSIS — Z79.899: ICD-10-CM

## 2020-09-29 DIAGNOSIS — Z01.811: ICD-10-CM

## 2020-09-29 DIAGNOSIS — I25.10: ICD-10-CM

## 2020-09-29 DIAGNOSIS — Z03.818: ICD-10-CM

## 2020-09-29 DIAGNOSIS — Z79.01: ICD-10-CM

## 2020-09-29 DIAGNOSIS — I10: ICD-10-CM

## 2020-09-29 DIAGNOSIS — J43.8: ICD-10-CM

## 2020-09-29 DIAGNOSIS — I48.91: ICD-10-CM

## 2020-09-29 DIAGNOSIS — Z01.810: ICD-10-CM

## 2020-09-29 LAB
INR PPP: 0.9
PROTHROMBIN TIME: 12.3 SEC (ref 11.4–15.4)

## 2020-09-29 PROCEDURE — 83036 HEMOGLOBIN GLYCOSYLATED A1C: CPT

## 2020-09-29 PROCEDURE — 81001 URINALYSIS AUTO W/SCOPE: CPT

## 2020-09-29 PROCEDURE — 85730 THROMBOPLASTIN TIME PARTIAL: CPT

## 2020-09-29 PROCEDURE — 93005 ELECTROCARDIOGRAM TRACING: CPT

## 2020-09-29 PROCEDURE — 71046 X-RAY EXAM CHEST 2 VIEWS: CPT

## 2020-09-29 PROCEDURE — 84132 ASSAY OF SERUM POTASSIUM: CPT

## 2020-09-29 PROCEDURE — 93010 ELECTROCARDIOGRAM REPORT: CPT

## 2020-09-29 PROCEDURE — 85027 COMPLETE CBC AUTOMATED: CPT

## 2020-09-29 PROCEDURE — 85610 PROTHROMBIN TIME: CPT

## 2020-09-29 PROCEDURE — 29848 WRIST ENDOSCOPY/SURGERY: CPT

## 2020-09-29 PROCEDURE — 36415 COLL VENOUS BLD VENIPUNCTURE: CPT

## 2020-09-29 PROCEDURE — C9803 HOPD COVID-19 SPEC COLLECT: HCPCS

## 2020-09-29 PROCEDURE — 87635 SARS-COV-2 COVID-19 AMP PRB: CPT

## 2020-09-29 PROCEDURE — 01810 ANES PX NRV MUSC F/ARM WRST: CPT

## 2020-09-29 PROCEDURE — 80048 BASIC METABOLIC PNL TOTAL CA: CPT

## 2020-10-06 NOTE — OPERATIVE REPORT
Operative Report


DATE OF SURGERY: 09/29/20


PREOPERATIVE DIAGNOSIS: Left carpal tunnel syndrome


POSTOPERATIVE DIAGNOSIS: Same


OPERATION: Left endoscopic carpal tunnel release


SURGEON: REENA STERLING


ANESTHESIA: LMAC


COMPLICATIONS: 





None


ESTIMATED BLOOD LOSS: Minimal


PROCEDURE: 





Indication for above procedure:





55-year-old male with numbness and tingling along the median nerve distribution.

 Patient electrodiagnostic testing confirming carpal tunnel syndrome after 

failed conservative management decision was made to proceed with operative inter

vention.  Risk and benefits of the surgical procedure were explained patient 

verbalized understanding consented for surgical procedure.





Procedure In Detail:





Patient was seen and evaluated in the preoperative holding area.  The LEFT upper

extremity was initialized and marked.  Patient received Ancef IV for bacterial 

prophylaxis.  Patient was taken back to the operative room where transferred 

operative table.  Patient was then placed under MAC anesthesia.  Once adequately

anesthetized, a nonsterile tourniquet was placed on the upper extremity.  A 

surgical team debriefing was performed ensuring all instrumentation was 

available, the surgical procedure was discussed with possible concerns reviewed.

 Skin was prepped with alcohol and 10cc of 1% lidocaine without epinephrine was 

injected locally and w/in carpal canal.  The upper extremity was prepped with 

chlorhexidine and alcohol and draped in a sterile fashion.   A timeout was done 

identifying correct patient, procedure and extremity everyone in attendance 

agree with this and verbalized no concerns.The extremity was then exsanguinated 

the tourniquet was inflated to 250 mmHg.





A transverse skin incision was made just proximal to the wrist flexion crease 

ulnar to the palmaris longus.  Blunt dissection was performed down to the 

palmaris longus tendon which was retracted radially.  Deep to the palmaris 

longus tendon was the volar carpal ligament this was incised identifying the 

median nerve deep.  With the use of a Banks elevator any soft tissue/synovium 

was freed from the undersurface of the transverse carpal ligament. The hook of 

hamate was identified ulnarly.  The ConMed cannulas were then introduced 

beginning with #1 progressing to a #3 gently dilating the carpal canal.  I then 

introduced the scope within the cannula and identified transverse carpal 

ligament ensuring the median nerve was not visualized within the cannula.  I 

triangulated distally with a 25-gauge needle identifying the distal aspect of 

the transverse carpal ligament, to ensure protection of the superficial palmar 

arch.  The arthroscopic knife was used to incise the transverse carpal ligament 

under direct visualization with the arthroscopic camera.  Any excess transverse 

fibers that remained after the first past were carefully released with a repeat 

pass.  The median nerve was then directly visualized radially without 

disruption.  Once this was completed I placed the #3 dilator and assured I got 

complete release of the transverse carpal ligament without residual compression.

 The median nerve was directly visualized and free of any overlying compression.

 I then turned my attention to release of the volar antebrachial fascia 

proximally.  Once again a Banks was used to open the wound and I proceeded with 

cannula #1 to #3.  The arthroscope was introduced into the cannula and under 

direct visualization the volar antebrachial fascia was released.  Once this was 

complete I copiusly irrigated the wound with normal saline.  The skin incision 

was closed with 4-0  Monocryl subcutaneous and a running subcuticular 4-0 

Monocryl.  This was reinforced with Dermabond and Steri-Strips.  Sterile, 4 x 

4's and a Ace bandage was placed loosely.  Sponge counts, instrument counts and 

needle counts were correct.  The was no intraoperative complications patient 

tolerated the procedure well and was stable to PACU.

## 2020-10-06 NOTE — DISCHARGE SUMMARY
Discharge Summary (SDC)





- Discharge


Final Diagnosis: 





Left carpal tunnel syndrome


Date of Surgery: 09/29/20


Discharge Date: 09/29/20


Condition: Good


Treatment or Instructions: 


Schedule Follow Up w/ Dr. Elmer Smyth @ [facility] to be seen 

in 10-14 days or as scheduled





[place]: [phone] 


[place]: [phone]


[place]: [phone] 





May remove dressing on postop day #3, keep incision covered and dry.





Ice and elevate





May begin finger range of motion attempting to make full fist.





Stool softener of choice when on pain medication.





USE OF OVER-THE-COUNTER IBUPROFEN:


     Ibuprofen (Advil, Nuprin, Medipren, Motrin IB) is a medication for fever 

and pain control.  In addition, it has anti- inflammatory effects which may be 

beneficial, especially in the treatment of injuries.


     It's best to take ibuprofen with food.  Persons with ulcer disease or 

allergy to aspirin should notify their physician of this before taking 

ibuprofen.


     Ibuprofen can be given every four to six hours, for a total of four doses 

daily.


     Age              Pain or fever dose          Antiinflammatory dose


     6-8 yr              200 mg (1 tab)                200 mg (1 tab)


     9-11 yr             200 mg (1 tab)                200-400 mg (1-2 tab)


     11-14 yr            200-400 mg (1-2 tab)         400 mg (2 tab)


     15-adult            400 mg (2 tab)                600 mg (3 tab)








ORAL NARCOTIC MEDICATION:


     You have been given a prescription for pain control.  This medication is a 

narcotic.  It's best taken with food, as nausea can result if taken on an empty 

stomach.


     Don't operate machinery or drive within six hours of taking this 

medication.  Do not combine this medicine with alcohol, or with any medication 

which can cause sedation (such as cold tablets or sleeping pills) unless you get

permission from the physician.


     Narcotics tend to cause constipation.  If possible, drink plenty of fluids 

and eat a diet high in fiber and fruits.





     Please be aware that prescription narcotics also have the potential for 

abuse.  People become addicted to these medications because of the general sense

of wellbeing that they induce.  This feeling along with a significant reduction 

in tension, anxiety, and aggression provides a stimulating seductive quality to 

these drugs.  Once your pain is under control, we encourage you to discard your 

unused narcotics.





Prescriptions: 


Hydrocodone/Acetaminophen [Norco 5-325 mg Tablet] 1 tab PO Q6 PRN #12 tablet


 PRN Reason: 


Referrals: 


JOHN TRIMBLE MD [Primary Care Provider] - 


Discharge Diet: As Tolerated


Respiratory Treatments at Home: Deep Breathing/Coughing, Incentive Spirometer


Discharge Activity: No Lifting Over 10 Pounds, No Lifting/Push/Pulling


Report the Following to Your Physician Immediately: Fever over 101 Degrees, 

Unusual Bleeding, Redness, Swelling, Warmth, Increased Soreness

## 2020-11-04 ENCOUNTER — HOSPITAL ENCOUNTER (INPATIENT)
Dept: HOSPITAL 62 - ER | Age: 55
LOS: 9 days | Discharge: HOME | DRG: 193 | End: 2020-11-13
Attending: FAMILY MEDICINE | Admitting: INTERNAL MEDICINE
Payer: COMMERCIAL

## 2020-11-04 DIAGNOSIS — Z79.51: ICD-10-CM

## 2020-11-04 DIAGNOSIS — J96.21: ICD-10-CM

## 2020-11-04 DIAGNOSIS — A04.72: ICD-10-CM

## 2020-11-04 DIAGNOSIS — I10: ICD-10-CM

## 2020-11-04 DIAGNOSIS — J18.9: Primary | ICD-10-CM

## 2020-11-04 DIAGNOSIS — Z88.8: ICD-10-CM

## 2020-11-04 DIAGNOSIS — F17.210: ICD-10-CM

## 2020-11-04 DIAGNOSIS — J43.8: ICD-10-CM

## 2020-11-04 DIAGNOSIS — Z72.89: ICD-10-CM

## 2020-11-04 DIAGNOSIS — G47.33: ICD-10-CM

## 2020-11-04 DIAGNOSIS — Z20.828: ICD-10-CM

## 2020-11-04 DIAGNOSIS — Z71.6: ICD-10-CM

## 2020-11-04 DIAGNOSIS — Z99.81: ICD-10-CM

## 2020-11-04 DIAGNOSIS — Z79.899: ICD-10-CM

## 2020-11-04 DIAGNOSIS — Z82.49: ICD-10-CM

## 2020-11-04 DIAGNOSIS — I48.0: ICD-10-CM

## 2020-11-04 DIAGNOSIS — E66.9: ICD-10-CM

## 2020-11-04 DIAGNOSIS — Z83.3: ICD-10-CM

## 2020-11-04 DIAGNOSIS — Z79.01: ICD-10-CM

## 2020-11-04 LAB
A TYPE INFLUENZA AG: NEGATIVE
ADD MANUAL DIFF: NO
ALBUMIN SERPL-MCNC: 3.5 G/DL (ref 3.5–5)
ALP SERPL-CCNC: 123 U/L (ref 38–126)
ANION GAP SERPL CALC-SCNC: 10 MMOL/L (ref 5–19)
ARTERIAL BLOOD FIO2: (no result)
ARTERIAL BLOOD FIO2: (no result)
ARTERIAL BLOOD H2CO3: 1.43 MMOL/L (ref 1.05–1.35)
ARTERIAL BLOOD H2CO3: 1.49 MMOL/L (ref 1.05–1.35)
ARTERIAL BLOOD HCO3: 30.6 MMOL/L (ref 20–24)
ARTERIAL BLOOD HCO3: 31 MMOL/L (ref 20–24)
ARTERIAL BLOOD PCO2: 47.6 MMHG (ref 35–45)
ARTERIAL BLOOD PCO2: 49.5 MMHG (ref 35–45)
ARTERIAL BLOOD PH: 7.41 (ref 7.35–7.45)
ARTERIAL BLOOD PH: 7.43 (ref 7.35–7.45)
ARTERIAL BLOOD PO2: 69.2 MMHG (ref 80–100)
ARTERIAL BLOOD PO2: 70.8 MMHG (ref 80–100)
ARTERIAL BLOOD TOTAL CO2: 32.1 MMOL/L (ref 23–27)
ARTERIAL BLOOD TOTAL CO2: 32.4 MMOL/L (ref 23–27)
AST SERPL-CCNC: 53 U/L (ref 17–59)
B INFLUENZA AG: NEGATIVE
BASE EXCESS BLDA CALC-SCNC: 4.6 MMOL/L
BASE EXCESS BLDA CALC-SCNC: 5.3 MMOL/L
BASOPHILS # BLD AUTO: 0.1 10^3/UL (ref 0–0.2)
BASOPHILS NFR BLD AUTO: 0.5 % (ref 0–2)
BILIRUB DIRECT SERPL-MCNC: 0.2 MG/DL (ref 0–0.4)
BILIRUB SERPL-MCNC: 0.7 MG/DL (ref 0.2–1.3)
BUN SERPL-MCNC: 27 MG/DL (ref 7–20)
CALCIUM: 9 MG/DL (ref 8.4–10.2)
CHLORIDE SERPL-SCNC: 97 MMOL/L (ref 98–107)
CK SERPL-CCNC: 187 U/L (ref 55–170)
CO2 SERPL-SCNC: 30 MMOL/L (ref 22–30)
EOSINOPHIL # BLD AUTO: 0.1 10^3/UL (ref 0–0.6)
EOSINOPHIL NFR BLD AUTO: 0.9 % (ref 0–6)
ERYTHROCYTE [DISTWIDTH] IN BLOOD BY AUTOMATED COUNT: 13.2 % (ref 11.5–14)
GLUCOSE SERPL-MCNC: 117 MG/DL (ref 75–110)
HCT VFR BLD CALC: 47.6 % (ref 37.9–51)
HGB BLD-MCNC: 17.1 G/DL (ref 13.5–17)
LYMPHOCYTES # BLD AUTO: 1.3 10^3/UL (ref 0.5–4.7)
LYMPHOCYTES NFR BLD AUTO: 8.6 % (ref 13–45)
MCH RBC QN AUTO: 35.1 PG (ref 27–33.4)
MCHC RBC AUTO-ENTMCNC: 35.8 G/DL (ref 32–36)
MCV RBC AUTO: 98 FL (ref 80–97)
MONOCYTES # BLD AUTO: 1.6 10^3/UL (ref 0.1–1.4)
MONOCYTES NFR BLD AUTO: 10.8 % (ref 3–13)
NEUTROPHILS # BLD AUTO: 11.9 10^3/UL (ref 1.7–8.2)
NEUTS SEG NFR BLD AUTO: 79.2 % (ref 42–78)
PLATELET # BLD: 233 10^3/UL (ref 150–450)
POTASSIUM SERPL-SCNC: 3.5 MMOL/L (ref 3.6–5)
PROT SERPL-MCNC: 6.7 G/DL (ref 6.3–8.2)
RBC # BLD AUTO: 4.87 10^6/UL (ref 4.35–5.55)
SAO2 % BLDA: 93.8 % (ref 94–98)
SAO2 % BLDA: 94.5 % (ref 94–98)
TOTAL CELLS COUNTED % (AUTO): 100 %
WBC # BLD AUTO: 15.1 10^3/UL (ref 4–10.5)

## 2020-11-04 PROCEDURE — 93010 ELECTROCARDIOGRAM REPORT: CPT

## 2020-11-04 PROCEDURE — 87186 SC STD MICRODIL/AGAR DIL: CPT

## 2020-11-04 PROCEDURE — 94660 CPAP INITIATION&MGMT: CPT

## 2020-11-04 PROCEDURE — 36415 COLL VENOUS BLD VENIPUNCTURE: CPT

## 2020-11-04 PROCEDURE — 82550 ASSAY OF CK (CPK): CPT

## 2020-11-04 PROCEDURE — 71045 X-RAY EXAM CHEST 1 VIEW: CPT

## 2020-11-04 PROCEDURE — 82103 ALPHA-1-ANTITRYPSIN TOTAL: CPT

## 2020-11-04 PROCEDURE — 93005 ELECTROCARDIOGRAM TRACING: CPT

## 2020-11-04 PROCEDURE — 87635 SARS-COV-2 COVID-19 AMP PRB: CPT

## 2020-11-04 PROCEDURE — 80061 LIPID PANEL: CPT

## 2020-11-04 PROCEDURE — 83520 IMMUNOASSAY QUANT NOS NONAB: CPT

## 2020-11-04 PROCEDURE — 82104 ALPHA-1-ANTITRYPSIN PHENO: CPT

## 2020-11-04 PROCEDURE — 87040 BLOOD CULTURE FOR BACTERIA: CPT

## 2020-11-04 PROCEDURE — 84443 ASSAY THYROID STIM HORMONE: CPT

## 2020-11-04 PROCEDURE — C9803 HOPD COVID-19 SPEC COLLECT: HCPCS

## 2020-11-04 PROCEDURE — 87150 DNA/RNA AMPLIFIED PROBE: CPT

## 2020-11-04 PROCEDURE — 80048 BASIC METABOLIC PNL TOTAL CA: CPT

## 2020-11-04 PROCEDURE — 94640 AIRWAY INHALATION TREATMENT: CPT

## 2020-11-04 PROCEDURE — 87205 SMEAR GRAM STAIN: CPT

## 2020-11-04 PROCEDURE — 85379 FIBRIN DEGRADATION QUANT: CPT

## 2020-11-04 PROCEDURE — 87449 NOS EACH ORGANISM AG IA: CPT

## 2020-11-04 PROCEDURE — 82728 ASSAY OF FERRITIN: CPT

## 2020-11-04 PROCEDURE — 96374 THER/PROPH/DIAG INJ IV PUSH: CPT

## 2020-11-04 PROCEDURE — 83615 LACTATE (LD) (LDH) ENZYME: CPT

## 2020-11-04 PROCEDURE — 83735 ASSAY OF MAGNESIUM: CPT

## 2020-11-04 PROCEDURE — 87077 CULTURE AEROBIC IDENTIFY: CPT

## 2020-11-04 PROCEDURE — 5A09557 ASSISTANCE WITH RESPIRATORY VENTILATION, GREATER THAN 96 CONSECUTIVE HOURS, CONTINUOUS POSITIVE AIRWAY PRESSURE: ICD-10-PCS | Performed by: INTERNAL MEDICINE

## 2020-11-04 PROCEDURE — 99291 CRITICAL CARE FIRST HOUR: CPT

## 2020-11-04 PROCEDURE — 85025 COMPLETE CBC W/AUTO DIFF WBC: CPT

## 2020-11-04 PROCEDURE — 87804 INFLUENZA ASSAY W/OPTIC: CPT

## 2020-11-04 PROCEDURE — 83880 ASSAY OF NATRIURETIC PEPTIDE: CPT

## 2020-11-04 PROCEDURE — 82803 BLOOD GASES ANY COMBINATION: CPT

## 2020-11-04 PROCEDURE — 86140 C-REACTIVE PROTEIN: CPT

## 2020-11-04 PROCEDURE — 93306 TTE W/DOPPLER COMPLETE: CPT

## 2020-11-04 PROCEDURE — 85730 THROMBOPLASTIN TIME PARTIAL: CPT

## 2020-11-04 PROCEDURE — 80053 COMPREHEN METABOLIC PANEL: CPT

## 2020-11-04 PROCEDURE — 83036 HEMOGLOBIN GLYCOSYLATED A1C: CPT

## 2020-11-04 PROCEDURE — 83690 ASSAY OF LIPASE: CPT

## 2020-11-04 PROCEDURE — 87324 CLOSTRIDIUM AG IA: CPT

## 2020-11-04 PROCEDURE — 84484 ASSAY OF TROPONIN QUANT: CPT

## 2020-11-04 PROCEDURE — 84100 ASSAY OF PHOSPHORUS: CPT

## 2020-11-04 PROCEDURE — 87070 CULTURE OTHR SPECIMN AEROBIC: CPT

## 2020-11-04 PROCEDURE — 85610 PROTHROMBIN TIME: CPT

## 2020-11-04 PROCEDURE — 87493 C DIFF AMPLIFIED PROBE: CPT

## 2020-11-04 PROCEDURE — 99285 EMERGENCY DEPT VISIT HI MDM: CPT

## 2020-11-04 PROCEDURE — 85027 COMPLETE CBC AUTOMATED: CPT

## 2020-11-04 PROCEDURE — 82150 ASSAY OF AMYLASE: CPT

## 2020-11-04 PROCEDURE — 96375 TX/PRO/DX INJ NEW DRUG ADDON: CPT

## 2020-11-04 RX ADMIN — AMIODARONE HYDROCHLORIDE SCH: 200 TABLET ORAL at 22:57

## 2020-11-04 RX ADMIN — FAMOTIDINE SCH: 20 TABLET, FILM COATED ORAL at 22:58

## 2020-11-04 RX ADMIN — METOPROLOL TARTRATE SCH: 50 TABLET, FILM COATED ORAL at 22:57

## 2020-11-04 RX ADMIN — Medication SCH: at 23:09

## 2020-11-04 RX ADMIN — ATORVASTATIN CALCIUM SCH: 10 TABLET, FILM COATED ORAL at 22:57

## 2020-11-04 RX ADMIN — APIXABAN SCH: 5 TABLET, FILM COATED ORAL at 22:57

## 2020-11-04 NOTE — ER DOCUMENT REPORT
ED Medical Screen (RME)





- General


Chief Complaint: Breathing Difficulty


Stated Complaint: TROUBLE BREATHING


Time Seen by Provider: 11/04/20 14:13


Primary Care Provider: 


JOHN TRIMBLE MD [Primary Care Provider] - Follow up as needed


Mode of Arrival: Wheelchair


Information source: Patient


Notes: 





55-year-old male presented to ED for severe shortness of breath.  O2 was 70 when

he first walked in the room.  At 2 L we got him up to 80% O2 sat.  4 L I have 

him up to an 82 and 84.  He does have very tight wheezes throughout.  I have 

called charge for a room.

















I have greeted and performed a rapid initial assessment of this patient.  A 

comprehensive ED assessment and evaluation of the patient, analysis of test 

results and completion of medical decision making process will be conducted by 

an additional ED providers.


TRAVEL OUTSIDE OF THE [place] IN LAST 30 DAYS: No





- Related Data


Allergies/Adverse Reactions: 


                                        





chlorpheniramine [From Actifed Cold-Allergy] Allergy (Verified 11/04/20 14:13)


   


phenylephrine [From Actifed Cold-Allergy] Allergy (Verified 11/04/20 14:13)


   


pseudoephedrine [From Actifed Cold-Allergy] Allergy (Verified 11/04/20 14:13)


   


triprolidine [From Actifed Cold-Allergy] Allergy (Verified 11/04/20 14:13)


   











Past Medical History





- Past Medical History


Cardiac Medical History: Reports: Hx Atrial Fibrillation - ????? He admits a 

prior episode of rapid heartbeat treated with metoprolol., Hx Hypercholestero

lemia - Mildly elevated cholesterol significantly elevated triglycerides., Hx 

Hypertension


   Denies: Hx Coronary Artery Disease, Hx Heart Attack


Pulmonary Medical History: Reports: Hx COPD - hx , Hx Pneumonia - hx


   Denies: Hx Asthma, Hx Bronchitis


Neurological Medical History: Denies: Hx Cerebrovascular Accident, Hx Seizures


Endocrine Medical History: Denies: Hx Diabetes Mellitus Type 1, Hx Diabetes 

Mellitus Type 2, Hx Hyperthyroidism, Hx Hypothyroidism


Renal/ Medical History: Denies: Hx Peritoneal Dialysis


GI Medical History: Denies: Hx Crohn's Disease, Hx Hepatitis, Hx Ulcerative 

Colitis


Musculoskeltal Medical History: Reports Hx Arthritis - mild, Denies Hx Gout


Skin Medical History: Denies Hx Eczema, Denies Hx Psoriasis


Psychiatric Medical History: Reports: Hx Depression


Traumatic Medical History: Denies: Hx Traumatic Brain Injury


Infectious Medical History: Denies: Hx Hepatitis


Past Surgical History: Reports: Hx Testicular Surgery - Orchiectomy, Other - 

Left orchiectomy in the remote past.





- Immunizations


Hx Diphtheria, Pertussis, Tetanus Vaccination: Yes





Doctor's Discharge





- Discharge


Referrals: 


JOHN TRIMBLE MD [Primary Care Provider] - Follow up as needed

## 2020-11-04 NOTE — EKG REPORT
SEVERITY:- ABNORMAL ECG -

SINUS RHYTHM

INCOMPLETE LEFT BUNDLE BRANCH BLOCK

EXCESSIVE BASELINE ARTEFACT( AFFECTS RHYTHM INTERPRETATION)

:

Confirmed by: Tutu Figueroa MD 04-Nov-2020 18:59:32

## 2020-11-04 NOTE — PROGRESS NOTE
Provider Note


Provider Note: 


Saw and examined patient.  Patient is significantly hypoxic.  Obtained ABG which

shows PO2 of 69 on 80% FiO2 on BiPAP. PAO2/FIO2 ratio of 86.  Discussed case 

with intensivist Dr. Capellan who has accepted patient to the ICU.  Notified ER 

nurse.

## 2020-11-04 NOTE — RADIOLOGY REPORT (SQ)
EXAM DESCRIPTION:  CHEST SINGLE VIEW



IMAGES COMPLETED DATE/TIME:  11/4/2020 2:55 pm



REASON FOR STUDY:  severe short of breath



COMPARISON:  AP chest 9/24/2020



EXAM PARAMETERS:  NUMBER OF VIEWS: One view.

TECHNIQUE: Single frontal radiographic view of the chest acquired.

RADIATION DOSE: NA

LIMITATIONS: None.



FINDINGS:  LUNGS AND PLEURA: Upper lobes are hyperlucent from obstructive disease.

In the mid and lower lung, there is alveolar and interstitial infiltrates from pulmonary edema or pne
umonia

MEDIASTINUM AND HILAR STRUCTURES: No masses.  Contour normal.

HEART AND VASCULAR STRUCTURES: Mild cardiomegaly, stable

BONES: Multiple old healed right posterior and lateral rib fractures

HARDWARE: None in the chest.

OTHER: No other significant finding.



IMPRESSION:  Obstructive lung disease

Mid and lower lung alveolar and interstitial infiltrates, edema versus pneumonia



TECHNICAL DOCUMENTATION:  JOB ID:  [id number]

 2011 [employer]- All Rights Reserved



[place] location - IP/workstation name: [id number]

## 2020-11-04 NOTE — ER DOCUMENT REPORT
ED General





- General


Chief Complaint: Shortness Of Breath


Stated Complaint: TROUBLE BREATHING


Time Seen by Provider: 11/04/20 14:13


Primary Care Provider: 


JOHN TRIMBLE MD [Primary Care Provider] - Follow up as needed


Mode of Arrival: Wheelchair


TRAVEL OUTSIDE OF THE [place] IN LAST 30 DAYS: No





- HPI


Notes: 





Chief complaint: Shortness of breath and cough





History of present illness: 55-year-old male followed by Dr. Trimble with history

of heavy cigarette smoking and COPD with as needed use of oxygen presents now 

with increasing shortness of breath over several days associated with production

of yellow sputum and intermittent low-grade temperature.  He denies known Covid 

exposure.  Says that he had a Covid nasal swab which was negative about 1 month 

ago when he underwent preop testing for carpal tunnel repair.  He denies chest 

pain.  He denies vomiting.  He continues to smoke about 1 pack of cigarettes per

day.  Patient has a history of paroxysmal atrial fibrillation and is on Eliquis.

 He says he is fully compliant with his medication.





- Related Data


Allergies/Adverse Reactions: 


                                        





chlorpheniramine [From Actifed Cold-Allergy] Allergy (Verified 11/04/20 14:13)


   


phenylephrine [From Actifed Cold-Allergy] Allergy (Verified 11/04/20 14:13)


   


pseudoephedrine [From Actifed Cold-Allergy] Allergy (Verified 11/04/20 14:13)


   


triprolidine [From Actifed Cold-Allergy] Allergy (Verified 11/04/20 14:13)


   








Home Medications: see copy of patient list on chart.  symbicort, spiriva, 

albuterol, cartiaxt, eliquis, hctz, metoprolol tart, asa, escitalopram, 

atorvastatin, amiodarone





Past Medical History





- General


Information source: Patient





- Social History


Smoking Status: Current Every Day Smoker


Chew tobacco use (# tins/day): No


Frequency of alcohol use: Social


Drug Abuse: None


Family History: DM, Hypertension


Patient has homicidal ideation: No





- Past Medical History


Cardiac Medical History: Reports: Hx Atrial Fibrillation - ????? He admits a 

prior episode of rapid heartbeat treated with metoprolol., Hx 

Hypercholesterolemia - Mildly elevated cholesterol significantly elevated 

triglycerides., Hx Hypertension


   Denies: Hx Coronary Artery Disease, Hx Heart Attack


Pulmonary Medical History: Reports: Hx COPD - hx , Hx Pneumonia - hx


   Denies: Hx Asthma, Hx Bronchitis


Neurological Medical History: Denies: Hx Cerebrovascular Accident, Hx Seizures


Endocrine Medical History: Denies: Hx Diabetes Mellitus Type 1, Hx Diabetes 

Mellitus Type 2, Hx Hyperthyroidism, Hx Hypothyroidism


Renal/ Medical History: Denies: Hx Peritoneal Dialysis


GI Medical History: Denies: Hx Crohn's Disease, Hx Hepatitis, Hx Ulcerative 

Colitis


Musculoskeletal Medical History: Reports Hx Arthritis - mild, Denies Hx Gout


Skin Medical History: Denies Hx Eczema, Denies Hx Psoriasis


Psychiatric Medical History: Reports: Hx Depression


Traumatic Medical History: Denies: Hx Traumatic Brain Injury


Infectious Medical History: Denies: Hx Hepatitis


Past Surgical History: Reports: Hx Testicular Surgery - Orchiectomy, Other - 

Left orchiectomy in the remote past.





- Immunizations


Hx Diphtheria, Pertussis, Tetanus Vaccination: Yes





Review of Systems





- Review of Systems


Notes: 





Constitutional: As per HPI.


HENT: Negative for sore throat.


Eyes: Negative for visual changes.


Cardiovascular: Negative for chest pain.


Respiratory: As per HPI.


Gastrointestinal: Negative for abdominal pain, vomiting or diarrhea.


Genitourinary: Negative for dysuria.


Musculoskeletal: Negative for back pain.


Skin: Negative for rash.


Neurological: Negative for headaches, weakness or numbness.





10 point ROS negative except as marked above and in HPI.








Physical Exam





- Vital signs


Vitals: 


                                        











Temp


 


 97.7 F 


 


 11/04/20 14:14














- Notes


Notes: 











GENERAL: Male patient of approximately stated age appears moderately dyspneic.





SKIN: Good turgor no rashes.





HEAD: Normocephalic atraumatic.





EYES: PERRLA.  EOMI.  Conjunctivae and sclerae clear.





EARS: CANALS AND TMS CLEAR.





NOSE: CLEAR.





MOUTH: Moist mucosa.  Good dentition.  No stridor or edema.  No drooling.





NECK: Supple.  No masses or thyromegaly.  No adenopathy.  Carotids 2+ without 

bruits.  No JVD.





BACK: Symmetrical without tenderness.





CHEST: Respirations are mildly labored and patient is tachypneic.  Scattered 

rhonchi and faint wheezes bilaterally.  Decreased breath sounds right base.





HEART: Regular rhythm.  No murmur gallop or rub.





ABDOMEN: Soft nontender without masses, organomegaly or rebound.  Bowel sounds 

normally active.  No bruits.





GENITALIA: Deferred.





EXTREMITIES: No edema.  No calf tenderness.  Cap refill less than 1.5 seconds.  

Dorsalis pedis and posterior tibial pulses 3+ and symmetrical.





NEUROLOGICAL: GCS 15.  Alert and oriented x3.  Fluent speech.  Cranial nerves II

through XII intact.  Sensorimotor and cerebellar normal.  Normal tone.





PSYCHIATRIC: Appropriate affect.





Course





- Re-evaluation


Re-evalutation: 





11/04/20 16:12


Patient initially required assistance with BiPAP.  His condition has been stable

on BiPAP.  His chest x-ray suggests a new right lower lobe infiltrate as well as

some chronic pleural changes on the right side.  We treated him as a community-

acquired pneumonia administering Rocephin and azithromycin.  We will speak with 

the hospitalist regarding admission.





- Vital Signs


Vital signs: 


                                        











Temp Pulse Resp BP Pulse Ox


 


 97.7 F   77   19   135/75 H  93 


 


 11/04/20 14:22  11/04/20 14:22  11/04/20 15:44  11/04/20 15:01  11/04/20 15:44














- Laboratory


Result Diagrams: 


                                 11/04/20 14:55





                                 11/04/20 14:55


Laboratory results interpreted by me: 


                                        











  11/04/20 11/04/20





  14:55 14:55


 


WBC  15.1 H 


 


Hgb  17.1 H 


 


MCV  98 H 


 


MCH  35.1 H 


 


Lymph % (Auto)  8.6 L 


 


Absolute Neuts (auto)  11.9 H 


 


Absolute Monos (auto)  1.6 H 


 


Seg Neutrophils %  79.2 H 


 


Sodium   136.7 L


 


Potassium   3.5 L


 


Chloride   97 L


 


BUN   27 H


 


Glucose   117 H


 


ALT   51 H


 


Creatine Kinase   187 H














- Diagnostic Test


Radiology reviewed: Image reviewed, Reports reviewed





- EKG Interpretation by Me


Additional EKG results interpreted by me: 





11/04/20 16:13


Twelve-lead EKG reviewed by me contemporaneously: 1544 hrs.


Indication for study: Dyspnea


Rhythm: Normal sinus


Rate: 66


Intervals:  ms


QRS axis: Incomplete left bundle branch block


ST/T wave changes: Nonspecific


Comparison with prior tracing: Substantially unchanged compared with prior 

tracing of 9/24/2020





Interpretation: Incomplete left bundle branch block





Critical Care Note





- Critical Care Note


Total time excluding time spent on procedures (mins): 35 - BiPAP





Discharge





- Discharge


Clinical Impression: 


 Obstructive pulmonary disease, Cigarette smoker





Acute and chronic respiratory failure (acute-on-chronic)


Qualifiers:


 Respiratory failure complication: hypoxia Qualified Code(s): J96.21 - Acute and

chronic respiratory failure with hypoxia





Pneumonia


Qualifiers:


 Pneumonia type: due to unspecified organism Laterality: right Lung location: 

unspecified part of lung Qualified Code(s): J18.9 - Pneumonia, unspecified 

organism





Condition: Serious


Disposition: ADMITTED AS INPATIENT


Admitting Provider: Nazanin (Hospitalist)


Unit Admitted: IMCU


Referrals: 


JOHN TRIMBLE MD [Primary Care Provider] - Follow up as needed

## 2020-11-05 LAB
ABSOLUTE LYMPHOCYTES# (MANUAL): 0.8 10^3/UL (ref 0.5–4.7)
ABSOLUTE MONOCYTES # (MANUAL): 0.4 10^3/UL (ref 0.1–1.4)
ADD MANUAL DIFF: YES
ALBUMIN SERPL-MCNC: 3.6 G/DL (ref 3.5–5)
ALP SERPL-CCNC: 107 U/L (ref 38–126)
AMYLASE SERPL-CCNC: 47 U/L (ref 30–110)
ANION GAP SERPL CALC-SCNC: 10 MMOL/L (ref 5–19)
APTT BLD: 35.1 SEC (ref 23.5–35.8)
ARTERIAL BLOOD FIO2: (no result)
ARTERIAL BLOOD FIO2: (no result)
ARTERIAL BLOOD H2CO3: 1.3 MMOL/L (ref 1.05–1.35)
ARTERIAL BLOOD H2CO3: 1.45 MMOL/L (ref 1.05–1.35)
ARTERIAL BLOOD H2CO3: 1.49 MMOL/L (ref 1.05–1.35)
ARTERIAL BLOOD HCO3: 30.5 MMOL/L (ref 20–24)
ARTERIAL BLOOD HCO3: 30.9 MMOL/L (ref 20–24)
ARTERIAL BLOOD HCO3: 31.4 MMOL/L (ref 20–24)
ARTERIAL BLOOD PCO2: 43.1 MMHG (ref 35–45)
ARTERIAL BLOOD PCO2: 48.1 MMHG (ref 35–45)
ARTERIAL BLOOD PCO2: 49.6 MMHG (ref 35–45)
ARTERIAL BLOOD PH: 7.41 (ref 7.35–7.45)
ARTERIAL BLOOD PH: 7.43 (ref 7.35–7.45)
ARTERIAL BLOOD PH: 7.47 (ref 7.35–7.45)
ARTERIAL BLOOD PO2: 54.8 MMHG (ref 80–100)
ARTERIAL BLOOD PO2: 57.3 MMHG (ref 80–100)
ARTERIAL BLOOD PO2: 66.9 MMHG (ref 80–100)
ARTERIAL BLOOD TOTAL CO2: 31.8 MMOL/L (ref 23–27)
ARTERIAL BLOOD TOTAL CO2: 32.4 MMOL/L (ref 23–27)
ARTERIAL BLOOD TOTAL CO2: 32.9 MMOL/L (ref 23–27)
AST SERPL-CCNC: 50 U/L (ref 17–59)
BASE EXCESS BLDA CALC-SCNC: 4.8 MMOL/L
BASE EXCESS BLDA CALC-SCNC: 5.8 MMOL/L
BASE EXCESS BLDA CALC-SCNC: 6 MMOL/L
BASOPHILS NFR BLD MANUAL: 0 % (ref 0–2)
BILIRUB DIRECT SERPL-MCNC: 0.4 MG/DL (ref 0–0.4)
BILIRUB SERPL-MCNC: 0.7 MG/DL (ref 0.2–1.3)
BUN SERPL-MCNC: 26 MG/DL (ref 7–20)
CALCIUM: 8.8 MG/DL (ref 8.4–10.2)
CHLORIDE SERPL-SCNC: 95 MMOL/L (ref 98–107)
CHOLEST SERPL-MCNC: 129.41 MG/DL (ref 0–200)
CO2 SERPL-SCNC: 30 MMOL/L (ref 22–30)
CRP SERPL-MCNC: 161.2 MG/L (ref ?–10)
DACRYOCYTES BLD QL SMEAR: SLIGHT
EOSINOPHIL NFR BLD MANUAL: 0 % (ref 0–6)
ERYTHROCYTE [DISTWIDTH] IN BLOOD BY AUTOMATED COUNT: 13.5 % (ref 11.5–14)
FERRITIN SERPL-MCNC: 437 NG/ML (ref 17.9–464)
GLUCOSE SERPL-MCNC: 204 MG/DL (ref 75–110)
HCT VFR BLD CALC: 50.3 % (ref 37.9–51)
HGB BLD-MCNC: 17.5 G/DL (ref 13.5–17)
INR PPP: 1.14
LDLC SERPL DIRECT ASSAY-MCNC: 84 MG/DL (ref ?–100)
MACROCYTES BLD QL SMEAR: SLIGHT
MCH RBC QN AUTO: 34.2 PG (ref 27–33.4)
MCHC RBC AUTO-ENTMCNC: 34.7 G/DL (ref 32–36)
MCV RBC AUTO: 99 FL (ref 80–97)
MONOCYTES % (MANUAL): 3 % (ref 3–13)
NT PRO BNP: 185 PG/ML (ref ?–125)
OVALOCYTES BLD QL SMEAR: SLIGHT
PHOSPHATE SERPL-MCNC: 4.9 MG/DL (ref 2.5–4.5)
PLATELET # BLD: 219 10^3/UL (ref 150–450)
PLATELET COMMENT: ADEQUATE
POIKILOCYTOSIS BLD QL SMEAR: SLIGHT
POLYCHROMASIA BLD QL SMEAR: SLIGHT
POTASSIUM SERPL-SCNC: 3.2 MMOL/L (ref 3.6–5)
PROT SERPL-MCNC: 6.6 G/DL (ref 6.3–8.2)
PROTHROMBIN TIME: 14.8 SEC (ref 11.4–15.4)
RBC # BLD AUTO: 5.11 10^6/UL (ref 4.35–5.55)
SAO2 % BLDA: 89.1 % (ref 94–98)
SAO2 % BLDA: 91.3 % (ref 94–98)
SAO2 % BLDA: 93.3 % (ref 94–98)
SEGMENTED NEUTROPHILS % (MAN): 90 % (ref 42–78)
TOTAL CELLS COUNTED BLD: 100
TOXIC GRANULES BLD QL SMEAR: (no result)
TRIGL SERPL-MCNC: 69 MG/DL (ref ?–150)
TROPONIN I SERPL-MCNC: < 0.012 NG/ML
VARIANT LYMPHS NFR BLD MANUAL: 7 % (ref 13–45)
VLDLC SERPL CALC-MCNC: 14 MG/DL (ref 10–31)
WBC # BLD AUTO: 12 10^3/UL (ref 4–10.5)

## 2020-11-05 PROCEDURE — B24BZZZ ULTRASONOGRAPHY OF HEART WITH AORTA: ICD-10-PCS

## 2020-11-05 RX ADMIN — Medication SCH: at 00:19

## 2020-11-05 RX ADMIN — IPRATROPIUM BROMIDE AND ALBUTEROL SULFATE SCH: 2.5; .5 SOLUTION RESPIRATORY (INHALATION) at 01:42

## 2020-11-05 RX ADMIN — IPRATROPIUM BROMIDE AND ALBUTEROL SULFATE SCH: 2.5; .5 SOLUTION RESPIRATORY (INHALATION) at 20:47

## 2020-11-05 RX ADMIN — FUROSEMIDE SCH MG: 10 INJECTION, SOLUTION INTRAMUSCULAR; INTRAVENOUS at 09:21

## 2020-11-05 RX ADMIN — ATORVASTATIN CALCIUM SCH MG: 10 TABLET, FILM COATED ORAL at 22:40

## 2020-11-05 RX ADMIN — Medication SCH: at 05:42

## 2020-11-05 RX ADMIN — APIXABAN SCH MG: 5 TABLET, FILM COATED ORAL at 09:21

## 2020-11-05 RX ADMIN — METOPROLOL TARTRATE SCH MG: 50 TABLET, FILM COATED ORAL at 09:22

## 2020-11-05 RX ADMIN — DOCUSATE SODIUM SCH MG: 100 CAPSULE, LIQUID FILLED ORAL at 09:22

## 2020-11-05 RX ADMIN — FAMOTIDINE SCH MG: 20 TABLET, FILM COATED ORAL at 09:22

## 2020-11-05 RX ADMIN — BUDESONIDE SCH: 0.5 SUSPENSION RESPIRATORY (INHALATION) at 07:39

## 2020-11-05 RX ADMIN — AZITHROMYCIN MONOHYDRATE SCH MLS/HR: 500 INJECTION, POWDER, LYOPHILIZED, FOR SOLUTION INTRAVENOUS at 11:03

## 2020-11-05 RX ADMIN — IPRATROPIUM BROMIDE AND ALBUTEROL SULFATE SCH: 2.5; .5 SOLUTION RESPIRATORY (INHALATION) at 00:11

## 2020-11-05 RX ADMIN — ALBUTEROL SULFATE SCH MG: 2.5 SOLUTION RESPIRATORY (INHALATION) at 20:46

## 2020-11-05 RX ADMIN — METHYLPREDNISOLONE SODIUM SUCCINATE SCH MG: 125 INJECTION, POWDER, FOR SOLUTION INTRAMUSCULAR; INTRAVENOUS at 13:08

## 2020-11-05 RX ADMIN — CEFEPIME HYDROCHLORIDE SCH MLS/HR: 1 INJECTION, SOLUTION INTRAVENOUS at 22:41

## 2020-11-05 RX ADMIN — CEFEPIME HYDROCHLORIDE SCH MLS/HR: 1 INJECTION, SOLUTION INTRAVENOUS at 10:54

## 2020-11-05 RX ADMIN — BUDESONIDE SCH MG: 0.5 SUSPENSION RESPIRATORY (INHALATION) at 20:46

## 2020-11-05 RX ADMIN — IPRATROPIUM BROMIDE AND ALBUTEROL SULFATE SCH: 2.5; .5 SOLUTION RESPIRATORY (INHALATION) at 14:01

## 2020-11-05 RX ADMIN — FAMOTIDINE SCH MG: 20 TABLET, FILM COATED ORAL at 22:40

## 2020-11-05 RX ADMIN — APIXABAN SCH MG: 5 TABLET, FILM COATED ORAL at 22:41

## 2020-11-05 RX ADMIN — Medication SCH ML: at 13:07

## 2020-11-05 RX ADMIN — IPRATROPIUM BROMIDE AND ALBUTEROL SULFATE SCH: 2.5; .5 SOLUTION RESPIRATORY (INHALATION) at 07:39

## 2020-11-05 RX ADMIN — ALBUTEROL SULFATE SCH MG: 2.5 SOLUTION RESPIRATORY (INHALATION) at 01:39

## 2020-11-05 RX ADMIN — ALBUTEROL SULFATE SCH MG: 2.5 SOLUTION RESPIRATORY (INHALATION) at 07:39

## 2020-11-05 RX ADMIN — AMIODARONE HYDROCHLORIDE SCH MG: 200 TABLET ORAL at 09:22

## 2020-11-05 RX ADMIN — Medication SCH: at 22:41

## 2020-11-05 RX ADMIN — AMIODARONE HYDROCHLORIDE SCH MG: 200 TABLET ORAL at 22:40

## 2020-11-05 RX ADMIN — ASPIRIN SCH MG: 81 TABLET, COATED ORAL at 09:22

## 2020-11-05 RX ADMIN — METOPROLOL TARTRATE SCH MG: 50 TABLET, FILM COATED ORAL at 22:40

## 2020-11-05 RX ADMIN — CEFEPIME HYDROCHLORIDE SCH: 1 INJECTION, SOLUTION INTRAVENOUS at 09:33

## 2020-11-05 RX ADMIN — BUDESONIDE SCH MG: 0.5 SUSPENSION RESPIRATORY (INHALATION) at 04:50

## 2020-11-05 RX ADMIN — BUDESONIDE SCH MG: 0.5 SUSPENSION RESPIRATORY (INHALATION) at 14:01

## 2020-11-05 RX ADMIN — ALBUTEROL SULFATE SCH: 2.5 SOLUTION RESPIRATORY (INHALATION) at 00:12

## 2020-11-05 RX ADMIN — Medication SCH ML: at 13:06

## 2020-11-05 RX ADMIN — ALBUTEROL SULFATE SCH MG: 2.5 SOLUTION RESPIRATORY (INHALATION) at 14:01

## 2020-11-05 NOTE — EKG REPORT
SEVERITY:- ABNORMAL ECG -

SINUS RHYTHM

IVCD

:

Confirmed by: Tutu Figueroa MD 05-Nov-2020 18:07:54

## 2020-11-05 NOTE — RADIOLOGY REPORT (SQ)
EXAM DESCRIPTION:  CHEST SINGLE VIEW



IMAGES COMPLETED DATE/TIME:  11/5/2020 6:06 am



REASON FOR STUDY:  BIPAP



COMPARISON:  CT chest 9/9/2019

Chest films 9/24/2020, 11/4/2020



EXAM PARAMETERS:  NUMBER OF VIEWS: One view.

TECHNIQUE: Single frontal radiographic view of the chest acquired.

RADIATION DOSE: NA

LIMITATIONS: None.



FINDINGS:  LUNGS AND PLEURA: Extensive changes of bilateral upper lobe obstructive lung disease.

In the mid and lower lungs, there are crowded vascular markings with alveolar and interstitial infilt
rates worrisome for either edema or pneumonia.  This is similar compared to 11/4/2020

MEDIASTINUM AND HILAR STRUCTURES: No masses.  Contour normal.

HEART AND VASCULAR STRUCTURES: No cardiomegaly

BONES: Old healed right rib fractures

HARDWARE: None in the chest.

OTHER: No other significant finding.



IMPRESSION:  Obstructive lung disease.

Persistent patchy bibasilar alveolar and interstitial infiltrates, similar compared to yesterday



TECHNICAL DOCUMENTATION:  JOB ID:  [id number]

 2011 [employer]- All Rights Reserved



[place] location - IP/workstation name: [id number]

## 2020-11-05 NOTE — XCELERA REPORT
[address] [address]

                               Tel: [phone]

                               Fax: [phone]



                      Transthoracic Echocardiogram Report

_______________________________________________________________________________



Name: LUDWIN SWENSON

MRN: [id number]                           Age: 55 yrs

Gender: Male                              : 1965

Patient Status: Inpatient                 Patient Location: ICU^601^A

Account #: [id number]

Study Date: 2020 09:55 AM

Accession #: [id number]

_______________________________________________________________________________



Height: 68 in        Weight: 263 lb        BSA: 2.3 m2

_______________________________________________________________________________

Procedure: A complete two-dimensional transthoracic echocardiogram was

performed (2D, M-mode, spectral and color flow Doppler). Study Quality: Poor.

Poor parasternal long axis, apical, subcostal and 2-chamber views.

Suboptimal 4-chamber views.

Reason For Study: CHF





Ordering Physician: ERLIN AMIN

Performed By: Anamaria Lott



_______________________________________________________________________________



Interpretation Summary

Poor quality study.

Poor parasternal long axis, apical, subcostal and 2-chamber views.

Suboptimal 4-chamber views.

The left ventricle is grossly normal size.

Left ventricular systolic function is normal.

The Ejection Fraction estimate is 65-70%.

Doppler measurements suggest impaired left ventricular relaxation, which is

associated with grade I/IV or mild diastolic dysfunction.

Regional wall motion abnormalities cannot be excluded due to limited

visualization.

Cannot comment on atrial abnormalities due to poor visualization.

Cannot comment on valvular pathology due to poor visualization.

Trace, hemodynamically insignificant anterior and anteroapical pericardial

effusion.



MMode/2D Measurements & Calculations

RVDd: 2.2 cm  LVIDd: 4.2 cm    FS: 30.7 %            Ao root diam: 2.4 cm

IVSd: 1.0 cm  LVIDs: 2.9 cm    EDV(Teich): 77.7 ml

                                                     Ao root area: 4.6 cm2

              LVPWd: 0.97 cm   ESV(Teich): 32.1 ml

                               EF(Teich): 58.7 %



Doppler Measurements & Calculations

MV E max erin:       MV dec slope:        Ao V2 max:         LV V1 max P.2 cm/sec         244.0 cm/sec2        110.2 cm/sec       3.5 mmHg

MV A max erin:       MV dec time: 0.27 secAo max PG:         LV V1 max:

71.9 cm/sec                              4.9 mmHg           93.2 cm/sec

MV E/A: 0.91

        _______________________________________________________________

PA V2 max:          TR max erin:

84.4 cm/sec         165.5 cm/sec

PA max P.8 mmHg TR max P.0 mmHg





Left Ventricle

The left ventricle is grossly normal size. Left ventricular systolic function

is normal. The Ejection Fraction estimate is 65-70%. Doppler measurements

suggest impaired left ventricular relaxation, which is associated with grade

I/IV or mild diastolic dysfunction. Regional wall motion abnormalities cannot

be excluded due to limited visualization.



Right Ventricle

The right ventricle is grossly normal size. The right ventricular systolic

function is normal.



Atria

Right atrium not well visualized secondary to technical limitations. The left

atrium is not well visualized secondary to technical limitations.

Interarterial septum not well visualized and not well dopplered. Cannot

comment on ASD/PFO presence.



Mitral Valve

The mitral valve is not well visualized. Evaluation of regurgitation is

inadequate.





Aortic Valve

The aortic valve is not well visualized secondary to technical limitations.

Suboptimal Doppler interrogation of the valve, cannot comment on aortic

stenosis. Cannot comment on regurgitation due to the poor quality of the

study.



Tricuspid Valve

The tricuspid valve is not well visualized secondary to technical limitations.

Cannot comment on regurgitation due to the poor quality of the study.



Pulmonic Valve

The pulmonic valve is not well visualized.



Effusions

Trace, hemodynamically insignificant anterior and anteroapical pericardial

effusion.





_______________________________________________________________________________

_______________________________________________________________________________

Electronically signed by:      Hong Huerta      on 2020 07:36 PM





CC: ERLIN AMIN, Hong

## 2020-11-06 LAB
ANION GAP SERPL CALC-SCNC: 10 MMOL/L (ref 5–19)
BUN SERPL-MCNC: 36 MG/DL (ref 7–20)
C DIFFICILE GDH: POSITIVE
CALCIUM: 9.1 MG/DL (ref 8.4–10.2)
CHLORIDE SERPL-SCNC: 97 MMOL/L (ref 98–107)
CO2 SERPL-SCNC: 32 MMOL/L (ref 22–30)
ERYTHROCYTE [DISTWIDTH] IN BLOOD BY AUTOMATED COUNT: 13.3 % (ref 11.5–14)
GLUCOSE SERPL-MCNC: 191 MG/DL (ref 75–110)
HCT VFR BLD CALC: 48.9 % (ref 37.9–51)
HGB BLD-MCNC: 16.9 G/DL (ref 13.5–17)
MCH RBC QN AUTO: 34.1 PG (ref 27–33.4)
MCHC RBC AUTO-ENTMCNC: 34.6 G/DL (ref 32–36)
MCV RBC AUTO: 99 FL (ref 80–97)
PLATELET # BLD: 277 10^3/UL (ref 150–450)
POTASSIUM SERPL-SCNC: 3.5 MMOL/L (ref 3.6–5)
RBC # BLD AUTO: 4.96 10^6/UL (ref 4.35–5.55)
WBC # BLD AUTO: 21.7 10^3/UL (ref 4–10.5)

## 2020-11-06 RX ADMIN — ASPIRIN SCH MG: 81 TABLET, COATED ORAL at 11:42

## 2020-11-06 RX ADMIN — METOPROLOL TARTRATE SCH MG: 50 TABLET, FILM COATED ORAL at 22:31

## 2020-11-06 RX ADMIN — BUDESONIDE SCH MG: 0.5 SUSPENSION RESPIRATORY (INHALATION) at 02:22

## 2020-11-06 RX ADMIN — BUDESONIDE SCH MG: 0.5 SUSPENSION RESPIRATORY (INHALATION) at 08:08

## 2020-11-06 RX ADMIN — CEFEPIME HYDROCHLORIDE SCH MLS/HR: 1 INJECTION, SOLUTION INTRAVENOUS at 22:30

## 2020-11-06 RX ADMIN — BUDESONIDE SCH MG: 0.5 SUSPENSION RESPIRATORY (INHALATION) at 14:34

## 2020-11-06 RX ADMIN — IPRATROPIUM BROMIDE AND ALBUTEROL SULFATE SCH: 2.5; .5 SOLUTION RESPIRATORY (INHALATION) at 08:07

## 2020-11-06 RX ADMIN — APIXABAN SCH MG: 5 TABLET, FILM COATED ORAL at 11:42

## 2020-11-06 RX ADMIN — AMIODARONE HYDROCHLORIDE SCH MG: 200 TABLET ORAL at 22:32

## 2020-11-06 RX ADMIN — IPRATROPIUM BROMIDE AND ALBUTEROL SULFATE SCH ML: 2.5; .5 SOLUTION RESPIRATORY (INHALATION) at 14:34

## 2020-11-06 RX ADMIN — APIXABAN SCH MG: 5 TABLET, FILM COATED ORAL at 22:30

## 2020-11-06 RX ADMIN — DOCUSATE SODIUM SCH MG: 100 CAPSULE, LIQUID FILLED ORAL at 11:42

## 2020-11-06 RX ADMIN — Medication SCH: at 14:21

## 2020-11-06 RX ADMIN — ALBUTEROL SULFATE SCH MG: 2.5 SOLUTION RESPIRATORY (INHALATION) at 08:08

## 2020-11-06 RX ADMIN — FAMOTIDINE SCH MG: 20 TABLET, FILM COATED ORAL at 11:42

## 2020-11-06 RX ADMIN — FUROSEMIDE SCH MG: 10 INJECTION, SOLUTION INTRAMUSCULAR; INTRAVENOUS at 12:25

## 2020-11-06 RX ADMIN — Medication SCH: at 05:30

## 2020-11-06 RX ADMIN — IPRATROPIUM BROMIDE AND ALBUTEROL SULFATE SCH ML: 2.5; .5 SOLUTION RESPIRATORY (INHALATION) at 19:51

## 2020-11-06 RX ADMIN — IPRATROPIUM BROMIDE AND ALBUTEROL SULFATE SCH: 2.5; .5 SOLUTION RESPIRATORY (INHALATION) at 02:23

## 2020-11-06 RX ADMIN — METOPROLOL TARTRATE SCH MG: 50 TABLET, FILM COATED ORAL at 11:42

## 2020-11-06 RX ADMIN — BUDESONIDE SCH MG: 0.5 SUSPENSION RESPIRATORY (INHALATION) at 19:51

## 2020-11-06 RX ADMIN — AMIODARONE HYDROCHLORIDE SCH MG: 200 TABLET ORAL at 11:42

## 2020-11-06 RX ADMIN — Medication SCH: at 05:29

## 2020-11-06 RX ADMIN — AZITHROMYCIN MONOHYDRATE SCH MLS/HR: 500 INJECTION, POWDER, LYOPHILIZED, FOR SOLUTION INTRAVENOUS at 14:20

## 2020-11-06 RX ADMIN — ATORVASTATIN CALCIUM SCH MG: 10 TABLET, FILM COATED ORAL at 22:31

## 2020-11-06 RX ADMIN — CEFEPIME HYDROCHLORIDE SCH MLS/HR: 1 INJECTION, SOLUTION INTRAVENOUS at 11:43

## 2020-11-06 RX ADMIN — ALBUTEROL SULFATE SCH MG: 2.5 SOLUTION RESPIRATORY (INHALATION) at 02:22

## 2020-11-06 RX ADMIN — Medication SCH: at 22:32

## 2020-11-06 RX ADMIN — METHYLPREDNISOLONE SODIUM SUCCINATE SCH MG: 125 INJECTION, POWDER, FOR SOLUTION INTRAMUSCULAR; INTRAVENOUS at 14:20

## 2020-11-06 RX ADMIN — FAMOTIDINE SCH MG: 20 TABLET, FILM COATED ORAL at 22:30

## 2020-11-06 NOTE — PDOC CRITICAL CARE PROG REPORT
General


Date:: 11/06/20


ICU Day:: 2


Hospital Day:: 2


Resuscitation Status: Full Code


Events in the past 12 to 24 Hours:: 





This 55-year-old  male smoker presented to [facility] 

emergency department on 11/4/2020 with increasing shortness of breath and 

increasing oxygen requirement.  He was initially admitted to the hospitalist 

service; however, we were contacted by Dr. Back with the request to admit to the

ICU, when the patient was assessed to be BiPAP dependent with an FiO2 of 80%.





11/6: The patient has had a positive response to diuresis.  He did not require 

BiPAP therapy; however, he did benefit from CPAP treatment yesterday.  He was 

able to tolerate CPAP 8, FiO2 90% and eventually weaned down to high flow nasal 

cannula.  During the evening, his obstructive sleep apnea was "rediagnosed", at 

which time he was placed back on CPAP therapy.  He is back on high flow nasal 

cannula this morning.  WBC 21.7 today, on steroids.  Awake, alert, oriented x3. 

In good spirits.


Review of systems relevant to events:: 





Respiratory: Dyspnea


Cardiovascular: Lower extremity edema


Reason for ICU Addmission:: COPD exacerbation, CHF





- Medications:


Medications reviewed and adjusted accordingly: Yes





Physical Exam


Vital Signs: 


                                        











Temp Pulse Resp BP Pulse Ox


 


 97.2 F   72   20   127/75 H  92 


 


 11/06/20 07:51  11/06/20 09:48  11/06/20 10:44  11/06/20 10:03  11/06/20 10:44








                                 Intake & Output











 11/05/20 11/06/20 11/07/20





 06:59 06:59 06:59


 


Intake Total 350 350 250


 


Output Total 1480 1690 200


 


Balance -1130 -1340 50


 


Weight 119.5 kg 117.5 kg 








                                  Weight/Height





Weight                           117.5 kg


Height                           1.73 m








General appearance: PRESENT: no acute distress, well-developed, well-nourished


Head exam: PRESENT: atraumatic, normocephalic


Eye exam: PRESENT: conjunctiva pink, EOMI, PERRLA.  ABSENT: scleral icterus


Mouth exam: PRESENT: moist, tongue midline


Neck exam: ABSENT: carotid bruit, JVD, lymphadenopathy, thyromegaly


Respiratory exam: PRESENT: crackles, rales.  ABSENT: accessory muscle use, 

rhonchi, wheezes


Cardiovascular exam: PRESENT: RRR.  ABSENT: diastolic murmur, rubs, systolic 

murmur


Pulses: PRESENT: normal dorsalis pedis pul


GI/Abdominal exam: PRESENT: normal bowel sounds, soft.  ABSENT: distended, 

guarding, mass, organolmegaly, rebound, tenderness


Extremities exam: PRESENT: full ROM, pedal edema.  ABSENT: calf tenderness, 

clubbing


Musculoskeletal exam: PRESENT: normal inspection.  ABSENT: deformity


Neurological exam: PRESENT: alert, awake, oriented to person, oriented to place,

oriented to time, oriented to situation, CN II-XII grossly intact.  ABSENT: 

motor sensory deficit


Psychiatric exam: ABSENT: agitated, anxious


Skin exam: PRESENT: dry, intact, warm.  ABSENT: cyanosis, rash





Laboratory/Radiographs


Laboratory Results: 


                                        





                                 11/06/20 03:40 





                                 11/06/20 03:40 





                                        











  11/05/20 11/06/20 11/06/20





  19:20 03:40 03:40


 


WBC    21.7 H


 


RBC    4.96


 


Hgb    16.9


 


Hct    48.9


 


MCV    99 H


 


MCH    34.1 H


 


MCHC    34.6


 


RDW    13.3


 


Plt Count    277


 


Sodium   139.1 


 


Potassium   3.5 L 


 


Chloride   97 L 


 


Carbon Dioxide   32 H 


 


Anion Gap   10 


 


BUN   36 H 


 


Creatinine   0.89 


 


Est GFR ( Amer)   > 60 


 


Glucose   191 H 


 


Calcium   9.1 


 


Magnesium   2.5 H 


 


Ferritin  437.00  


 


C-Reactive Protein  161.2 H  








                                        





11/04/20 21:47   Blood   Blood Culture (PCR) - Final


                            Staphylococcus Species





                                        











  11/04/20 11/04/20 11/05/20





  14:55 14:55 03:42


 


Creatine Kinase  187 H  


 


Troponin I   < 0.012  < 0.012


 


NT-Pro-B Natriuret Pep    185 H











Impressions: 


                                        





Chest X-Ray  11/05/20 04:00


IMPRESSION:  Obstructive lung disease.


Persistent patchy bibasilar alveolar and interstitial infiltrates, similar 

compared to yesterday


 











All labs, radiographs, diagnostic studies and EKGs were personally reviewed: Yes


In addition, reports of radiographic and diagnostic studies were read: Yes





Assessment and Plan





- Diagnosis


(1) Acute and chronic respiratory failure (acute-on-chronic)


Qualifiers: 


   Respiratory failure complication: hypoxia   Qualified Code(s): J96.21 - Acute

and chronic respiratory failure with hypoxia   


Is this a current diagnosis for this admission?: Yes   


Plan: 





* Continue high flow nasal cannula during the day.  Maintain FiO2 100%.  Wean 

  flow rate as tolerated.


* CPAP 8 nightly.  Titrate FiO2 to maintain SPO2 89-93%.


* Continue steroids.











(2) Pneumonia


Qualifiers: 


   Pneumonia type: due to unspecified organism   Laterality: right   Lung 

location: lower lobe of lung   Qualified Code(s): J18.9 - Pneumonia, unspecified

organism   


Is this a current diagnosis for this admission?: Yes   


Plan: 





* Change cefepime to Rocephin.  Should be able to change to p.o. Ceftin t

  omorrow.


* Continue Zithromax.








(3) Paroxysmal atrial fibrillation


Is this a current diagnosis for this admission?: Yes   


Plan: 


Continue Eliquis








(4) Bullous emphysema


Is this a current diagnosis for this admission?: Yes   


Plan: 


DuoNeb/budesonide scheduled.








(5) Cigarette smoker


Is this a current diagnosis for this admission?: Yes   


Plan: 


Smoking cessation counseling provided.








Critical Time


Critical Time (minutes): 60


Level of Care: ICU


-: 


1.  The care of a critical patient is a dynamic process.  This note is a 

representative synopsis but static in nature.  The timeframe for treatments 

given in order is not necessarily the actual time these treatments may have been

done.





2.  This patient requires critical care secondary to ongoing requirements for 

therapy not offered or safe outside the critical care environment.  Transfer to 

a lower level of care will result in altered life or limb morbidity and 

mortality.





3.  Multidisciplinary rounds completed.





4.  ABCDE bundle addressed.

## 2020-11-07 LAB
ANION GAP SERPL CALC-SCNC: 8 MMOL/L (ref 5–19)
BUN SERPL-MCNC: 43 MG/DL (ref 7–20)
CALCIUM: 9.2 MG/DL (ref 8.4–10.2)
CHLORIDE SERPL-SCNC: 96 MMOL/L (ref 98–107)
CO2 SERPL-SCNC: 35 MMOL/L (ref 22–30)
ERYTHROCYTE [DISTWIDTH] IN BLOOD BY AUTOMATED COUNT: 13.3 % (ref 11.5–14)
GLUCOSE SERPL-MCNC: 192 MG/DL (ref 75–110)
HCT VFR BLD CALC: 50.5 % (ref 37.9–51)
HGB BLD-MCNC: 17.1 G/DL (ref 13.5–17)
MCH RBC QN AUTO: 33.8 PG (ref 27–33.4)
MCHC RBC AUTO-ENTMCNC: 33.9 G/DL (ref 32–36)
MCV RBC AUTO: 100 FL (ref 80–97)
PLATELET # BLD: 309 10^3/UL (ref 150–450)
POTASSIUM SERPL-SCNC: 4.5 MMOL/L (ref 3.6–5)
RBC # BLD AUTO: 5.07 10^6/UL (ref 4.35–5.55)
WBC # BLD AUTO: 21.6 10^3/UL (ref 4–10.5)

## 2020-11-07 RX ADMIN — ATORVASTATIN CALCIUM SCH MG: 10 TABLET, FILM COATED ORAL at 22:50

## 2020-11-07 RX ADMIN — METHYLPREDNISOLONE SODIUM SUCCINATE SCH MG: 125 INJECTION, POWDER, FOR SOLUTION INTRAMUSCULAR; INTRAVENOUS at 22:49

## 2020-11-07 RX ADMIN — CEFEPIME HYDROCHLORIDE SCH MLS/HR: 1 INJECTION, SOLUTION INTRAVENOUS at 22:48

## 2020-11-07 RX ADMIN — APIXABAN SCH MG: 5 TABLET, FILM COATED ORAL at 22:50

## 2020-11-07 RX ADMIN — BUDESONIDE SCH MG: 0.5 SUSPENSION RESPIRATORY (INHALATION) at 01:50

## 2020-11-07 RX ADMIN — APIXABAN SCH MG: 5 TABLET, FILM COATED ORAL at 09:24

## 2020-11-07 RX ADMIN — DOCUSATE SODIUM SCH: 100 CAPSULE, LIQUID FILLED ORAL at 09:26

## 2020-11-07 RX ADMIN — Medication SCH ML: at 22:50

## 2020-11-07 RX ADMIN — BUDESONIDE SCH MG: 0.5 SUSPENSION RESPIRATORY (INHALATION) at 20:57

## 2020-11-07 RX ADMIN — METOPROLOL TARTRATE SCH MG: 50 TABLET, FILM COATED ORAL at 09:24

## 2020-11-07 RX ADMIN — FAMOTIDINE SCH MG: 20 TABLET, FILM COATED ORAL at 22:50

## 2020-11-07 RX ADMIN — Medication SCH: at 06:33

## 2020-11-07 RX ADMIN — METHYLPREDNISOLONE SODIUM SUCCINATE SCH MG: 125 INJECTION, POWDER, FOR SOLUTION INTRAMUSCULAR; INTRAVENOUS at 09:27

## 2020-11-07 RX ADMIN — IPRATROPIUM BROMIDE AND ALBUTEROL SULFATE SCH ML: 2.5; .5 SOLUTION RESPIRATORY (INHALATION) at 14:57

## 2020-11-07 RX ADMIN — FAMOTIDINE SCH MG: 20 TABLET, FILM COATED ORAL at 09:24

## 2020-11-07 RX ADMIN — AMIODARONE HYDROCHLORIDE SCH MG: 200 TABLET ORAL at 09:26

## 2020-11-07 RX ADMIN — AZITHROMYCIN MONOHYDRATE SCH MLS/HR: 500 INJECTION, POWDER, LYOPHILIZED, FOR SOLUTION INTRAVENOUS at 11:12

## 2020-11-07 RX ADMIN — Medication SCH MG: at 12:10

## 2020-11-07 RX ADMIN — Medication SCH: at 06:05

## 2020-11-07 RX ADMIN — BUDESONIDE SCH MG: 0.5 SUSPENSION RESPIRATORY (INHALATION) at 07:46

## 2020-11-07 RX ADMIN — Medication SCH MG: at 23:00

## 2020-11-07 RX ADMIN — AMIODARONE HYDROCHLORIDE SCH MG: 200 TABLET ORAL at 22:50

## 2020-11-07 RX ADMIN — ASPIRIN SCH MG: 81 TABLET, COATED ORAL at 09:24

## 2020-11-07 RX ADMIN — Medication SCH MG: at 17:47

## 2020-11-07 RX ADMIN — Medication SCH: at 22:50

## 2020-11-07 RX ADMIN — METOPROLOL TARTRATE SCH MG: 50 TABLET, FILM COATED ORAL at 22:50

## 2020-11-07 RX ADMIN — Medication SCH: at 13:45

## 2020-11-07 RX ADMIN — Medication SCH: at 13:44

## 2020-11-07 RX ADMIN — BUDESONIDE SCH MG: 0.5 SUSPENSION RESPIRATORY (INHALATION) at 14:57

## 2020-11-07 RX ADMIN — IPRATROPIUM BROMIDE AND ALBUTEROL SULFATE SCH ML: 2.5; .5 SOLUTION RESPIRATORY (INHALATION) at 07:46

## 2020-11-07 RX ADMIN — FUROSEMIDE SCH MG: 10 INJECTION, SOLUTION INTRAMUSCULAR; INTRAVENOUS at 09:26

## 2020-11-07 RX ADMIN — IPRATROPIUM BROMIDE AND ALBUTEROL SULFATE SCH ML: 2.5; .5 SOLUTION RESPIRATORY (INHALATION) at 20:57

## 2020-11-07 RX ADMIN — Medication SCH MG: at 06:32

## 2020-11-07 RX ADMIN — IPRATROPIUM BROMIDE AND ALBUTEROL SULFATE SCH ML: 2.5; .5 SOLUTION RESPIRATORY (INHALATION) at 01:50

## 2020-11-07 RX ADMIN — CEFEPIME HYDROCHLORIDE SCH MLS/HR: 1 INJECTION, SOLUTION INTRAVENOUS at 09:33

## 2020-11-07 NOTE — PDOC CRITICAL CARE PROG REPORT
General


Date:: 11/07/20


ICU Day:: 3


Hospital Day:: 3


Resuscitation Status: Full Code


Events in the past 12 to 24 Hours:: 





This 55-year-old  male smoker [facility]2020 with increasing shortness of breath and 

increasing oxygen requirement.  He was initially admitted to the hospitalist 

service; however, we were contacted by Dr. Back with the request to admit to the

ICU, when the patient was assessed to be BiPAP dependent with an FiO2 of 80%.





11/6: The patient has had a positive response to diuresis.  He did not require 

BiPAP therapy; however, he did benefit from CPAP treatment yesterday.  He was 

able to tolerate CPAP 8, FiO2 90% and eventually weaned down to high flow nasal 

cannula.  During the evening, his obstructive sleep apnea was "rediagnosed", at 

which time he was placed back on CPAP therapy.  He is back on high flow nasal 

cannula this morning.  WBC 21.7 today, on steroids.  Awake, alert, oriented x3. 

In good spirits.





11/7: Slept on CPAP overnight.  The patient had 2 episodes of diarrhea 

yesterday.  C. difficile was checked and found to be positive.  Started on p.o. 

vancomycin.  Back on high flow nasal cannula during awake hours.  Chest x-ray 

shows no significant change.  Monitor shows sinus bradycardia, heart rate 55.


Review of systems relevant to events:: 





Respiratory: Dyspnea


Cardiovascular: Lower extremity edema


Reason for ICU Addmission:: COPD exacerbation, CHF





- Medications:


Medications reviewed and adjusted accordingly: Yes





Physical Exam


Vital Signs: 


                                        











Temp Pulse Resp BP Pulse Ox


 


 97.9 F   50 L  15   145/81 H  96 


 


 11/07/20 08:00  11/07/20 08:00  11/07/20 08:00  11/07/20 08:00  11/07/20 08:00








                                 Intake & Output











 11/06/20 11/07/20 11/08/20





 06:59 06:59 06:59


 


Intake Total 350 1100 


 


Output Total 1690 2005 200


 


Balance -1340 -905 -200


 


Weight 117.5 kg 117.6 kg 








                                  Weight/Height





Weight                           117.6 kg


Height                           1.73 m








General appearance: PRESENT: no acute distress, well-developed, well-nourished


Head exam: PRESENT: atraumatic, normocephalic


Eye exam: PRESENT: conjunctiva pink, EOMI, PERRLA.  ABSENT: scleral icterus


Mouth exam: PRESENT: moist, tongue midline


Neck exam: ABSENT: carotid bruit, JVD, lymphadenopathy, thyromegaly


Respiratory exam: PRESENT: crackles, decreased breath sounds, prolonged expirato

ry phas, symmetrical, unlabored, wheezes.  ABSENT: rhonchi, tachypnea


Cardiovascular exam: PRESENT: bradycardia, RRR.  ABSENT: diastolic murmur, rubs,

systolic murmur


Pulses: PRESENT: normal dorsalis pedis pul


GI/Abdominal exam: PRESENT: normal bowel sounds, soft.  ABSENT: distended, 

guarding, mass, organolmegaly, rebound, tenderness


Extremities exam: PRESENT: full ROM, pedal edema.  ABSENT: calf tenderness, 

clubbing


Musculoskeletal exam: PRESENT: normal inspection.  ABSENT: deformity


Neurological exam: PRESENT: alert, awake, oriented to person, oriented to place,

oriented to time, oriented to situation, CN II-XII grossly intact.  ABSENT: mot

or sensory deficit


Psychiatric exam: ABSENT: agitated, anxious


Skin exam: PRESENT: dry, intact, warm.  ABSENT: cyanosis, rash





Laboratory/Radiographs


Laboratory Results: 


                                        





                                 11/07/20 05:10 





                                 11/07/20 05:10 





                                        











  11/06/20 11/07/20 11/07/20





  14:30 05:10 05:10


 


WBC    21.6 H


 


RBC    5.07


 


Hgb    17.1 H


 


Hct    50.5


 


MCV    100 H


 


MCH    33.8 H


 


MCHC    33.9


 


RDW    13.3


 


Plt Count    309


 


Sodium   139.2 


 


Potassium   4.5 


 


Chloride   96 L 


 


Carbon Dioxide   35 H 


 


Anion Gap   8 


 


BUN   43 H 


 


Creatinine   1.14 


 


Est GFR ( Amer)   > 60 


 


Glucose   192 H 


 


Calcium   9.2 


 


Magnesium   2.8 H 


 


Stl C.difficile Tox PCR  POSITIVE  








                                        





11/04/20 21:47   Blood   Blood Culture (PCR) - Final


                            Staphylococcus Species





                                        











  11/04/20 11/04/20 11/05/20





  14:55 14:55 03:42


 


Creatine Kinase  187 H  


 


Troponin I   < 0.012  < 0.012


 


NT-Pro-B Natriuret Pep    185 H











Impressions: 


                                        





Chest X-Ray  11/07/20 05:00


IMPRESSION:  Severe bullous emphysema in the left lung with compressive 

atelectasis at the bases.


No change.


 











All labs, radiographs, diagnostic studies and EKGs were personally reviewed: Yes


In addition, reports of radiographic and diagnostic studies were read: Yes





Assessment and Plan





- Diagnosis


(1) Acute and chronic respiratory failure (acute-on-chronic)


Qualifiers: 


   Respiratory failure complication: hypoxia   Qualified Code(s): J96.21 - Acute

and chronic respiratory failure with hypoxia   


Is this a current diagnosis for this admission?: Yes   


Plan: 





* Continue high flow nasal cannula during the day.  Maintain FiO2 100%.  Wean 

  flow rate as tolerated.


* CPAP 8 nightly.  Titrate FiO2 to maintain SPO2 89-93%.


* Continue steroids.


* Diurese today.











(2) Paroxysmal atrial fibrillation


Is this a current diagnosis for this admission?: Yes   


Plan: 





* Continue Eliquis.


* On (home med) amiodarone.








(3) Bullous emphysema


Is this a current diagnosis for this admission?: Yes   


Plan: 


DuoNeb/budesonide scheduled.








(4) C. difficile diarrhea


Is this a current diagnosis for this admission?: Yes   


Plan: 


On vancomycin 125 mg p.o. every 6 hours.








(5) Pneumonia


Qualifiers: 


   Pneumonia type: due to unspecified organism   Laterality: right   Lung 

location: lower lobe of lung   Qualified Code(s): J18.9 - Pneumonia, unspecified

organism   


Is this a current diagnosis for this admission?: Yes   


Plan: 





* On Rocephin/Zithromax.  We will stop after 5-day course in the absence of 

  compelling indication to continue antibiotic therapy.


* Trach aspirate isolated in normal respiratory stepan.


* CBC in a.m.


.








(6) Cigarette smoker


Is this a current diagnosis for this admission?: Yes   





Critical Time


Critical Time (minutes): 60


Level of Care: ICU


-: 


1.  The care of a critical patient is a dynamic process.  This note is a 

representative synopsis but static in nature.  The timeframe for treatments 

given in order is not necessarily the actual time these treatments may have been

done.





2.  This patient requires critical care secondary to ongoing requirements for 

therapy not offered or safe outside the critical care environment.  Transfer to 

a lower level of care will result in altered life or limb morbidity and 

mortality.





3.  Multidisciplinary rounds completed.





4.  ABCDE bundle addressed.

## 2020-11-07 NOTE — RADIOLOGY REPORT (SQ)
EXAM DESCRIPTION:  CHEST SINGLE VIEW



IMAGES COMPLETED DATE/TIME:  11/7/2020 5:59 am



REASON FOR STUDY:  dyspnea



COMPARISON:  11/5/2020



EXAM PARAMETERS:  NUMBER OF VIEWS: One view.

TECHNIQUE: Single frontal radiographic view of the chest acquired.

RADIATION DOSE: NA

LIMITATIONS: None.



FINDINGS:  LUNGS AND PLEURA: Marked bullous emphysema of the left upper lobe particular.  Compressive
 atelectasis at the bases.  No change from previous.  No obvious pneumothorax.

MEDIASTINUM AND HILAR STRUCTURES: No masses.  Contour normal.

HEART AND VASCULAR STRUCTURES: Heart normal in size.  Normal vasculature.

BONES: Multiple old rib fractures.

HARDWARE: None in the chest.

OTHER: No other significant finding.



IMPRESSION:  Severe bullous emphysema in the left lung with compressive atelectasis at the bases.

No change.



TECHNICAL DOCUMENTATION:  JOB ID:  [id number]

 2011 [employer]- All Rights Reserved



Reading location - IP/workstation name: [email or web address]

## 2020-11-08 LAB
ABSOLUTE LYMPHOCYTES# (MANUAL): 1.2 10^3/UL (ref 0.5–4.7)
ABSOLUTE MONOCYTES # (MANUAL): 1 10^3/UL (ref 0.1–1.4)
ADD MANUAL DIFF: YES
ANION GAP SERPL CALC-SCNC: 7 MMOL/L (ref 5–19)
ARTERIAL BLOOD FIO2: (no result)
ARTERIAL BLOOD H2CO3: 1.47 MMOL/L (ref 1.05–1.35)
ARTERIAL BLOOD HCO3: 33.7 MMOL/L (ref 20–24)
ARTERIAL BLOOD PCO2: 49 MMHG (ref 35–45)
ARTERIAL BLOOD PH: 7.46 (ref 7.35–7.45)
ARTERIAL BLOOD PO2: 44.8 MMHG (ref 80–100)
ARTERIAL BLOOD TOTAL CO2: 35.2 MMOL/L (ref 23–27)
BASE EXCESS BLDA CALC-SCNC: 8 MMOL/L
BASOPHILS NFR BLD MANUAL: 0 % (ref 0–2)
BUN SERPL-MCNC: 45 MG/DL (ref 7–20)
CALCIUM: 9.2 MG/DL (ref 8.4–10.2)
CHLORIDE SERPL-SCNC: 94 MMOL/L (ref 98–107)
CO2 SERPL-SCNC: 37 MMOL/L (ref 22–30)
EOSINOPHIL NFR BLD MANUAL: 0 % (ref 0–6)
ERYTHROCYTE [DISTWIDTH] IN BLOOD BY AUTOMATED COUNT: 13.6 % (ref 11.5–14)
GLUCOSE SERPL-MCNC: 228 MG/DL (ref 75–110)
HCT VFR BLD CALC: 50.1 % (ref 37.9–51)
HGB BLD-MCNC: 17.1 G/DL (ref 13.5–17)
MACROCYTES BLD QL SMEAR: SLIGHT
MCH RBC QN AUTO: 34 PG (ref 27–33.4)
MCHC RBC AUTO-ENTMCNC: 34.1 G/DL (ref 32–36)
MCV RBC AUTO: 99 FL (ref 80–97)
MONOCYTES % (MANUAL): 5 % (ref 3–13)
PLATELET # BLD: 297 10^3/UL (ref 150–450)
PLATELET COMMENT: ADEQUATE
POTASSIUM SERPL-SCNC: 4.9 MMOL/L (ref 3.6–5)
RBC # BLD AUTO: 5.04 10^6/UL (ref 4.35–5.55)
SAO2 % BLDA: 82.5 % (ref 94–98)
SEGMENTED NEUTROPHILS % (MAN): 89 % (ref 42–78)
TOTAL CELLS COUNTED BLD: 100
VARIANT LYMPHS NFR BLD MANUAL: 6 % (ref 13–45)
WBC # BLD AUTO: 19.7 10^3/UL (ref 4–10.5)

## 2020-11-08 RX ADMIN — Medication SCH: at 13:57

## 2020-11-08 RX ADMIN — Medication SCH MG: at 08:17

## 2020-11-08 RX ADMIN — ASPIRIN SCH MG: 81 TABLET, COATED ORAL at 09:39

## 2020-11-08 RX ADMIN — FUROSEMIDE SCH MG: 10 INJECTION, SOLUTION INTRAMUSCULAR; INTRAVENOUS at 09:40

## 2020-11-08 RX ADMIN — IPRATROPIUM BROMIDE AND ALBUTEROL SULFATE SCH ML: 2.5; .5 SOLUTION RESPIRATORY (INHALATION) at 20:25

## 2020-11-08 RX ADMIN — ATORVASTATIN CALCIUM SCH MG: 10 TABLET, FILM COATED ORAL at 22:24

## 2020-11-08 RX ADMIN — APIXABAN SCH MG: 5 TABLET, FILM COATED ORAL at 22:23

## 2020-11-08 RX ADMIN — CEFEPIME HYDROCHLORIDE SCH MLS/HR: 1 INJECTION, SOLUTION INTRAVENOUS at 09:32

## 2020-11-08 RX ADMIN — AMIODARONE HYDROCHLORIDE SCH MG: 200 TABLET ORAL at 09:39

## 2020-11-08 RX ADMIN — METHYLPREDNISOLONE SODIUM SUCCINATE SCH MG: 125 INJECTION, POWDER, FOR SOLUTION INTRAMUSCULAR; INTRAVENOUS at 09:40

## 2020-11-08 RX ADMIN — BUDESONIDE SCH MG: 0.5 SUSPENSION RESPIRATORY (INHALATION) at 20:24

## 2020-11-08 RX ADMIN — Medication SCH: at 06:58

## 2020-11-08 RX ADMIN — BUDESONIDE SCH MG: 0.5 SUSPENSION RESPIRATORY (INHALATION) at 02:31

## 2020-11-08 RX ADMIN — AMIODARONE HYDROCHLORIDE SCH MG: 200 TABLET ORAL at 22:23

## 2020-11-08 RX ADMIN — CLOBETASOL PROPIONATE SCH APPLIC: 0.5 OINTMENT TOPICAL at 14:45

## 2020-11-08 RX ADMIN — IPRATROPIUM BROMIDE AND ALBUTEROL SULFATE SCH ML: 2.5; .5 SOLUTION RESPIRATORY (INHALATION) at 07:55

## 2020-11-08 RX ADMIN — Medication SCH MG: at 10:45

## 2020-11-08 RX ADMIN — Medication SCH MG: at 17:56

## 2020-11-08 RX ADMIN — FAMOTIDINE SCH MG: 20 TABLET, FILM COATED ORAL at 09:40

## 2020-11-08 RX ADMIN — CEFEPIME HYDROCHLORIDE SCH MLS/HR: 1 INJECTION, SOLUTION INTRAVENOUS at 22:24

## 2020-11-08 RX ADMIN — IPRATROPIUM BROMIDE AND ALBUTEROL SULFATE SCH ML: 2.5; .5 SOLUTION RESPIRATORY (INHALATION) at 14:02

## 2020-11-08 RX ADMIN — METHYLPREDNISOLONE SODIUM SUCCINATE SCH MG: 125 INJECTION, POWDER, FOR SOLUTION INTRAMUSCULAR; INTRAVENOUS at 22:25

## 2020-11-08 RX ADMIN — METOPROLOL TARTRATE SCH MG: 50 TABLET, FILM COATED ORAL at 22:25

## 2020-11-08 RX ADMIN — APIXABAN SCH MG: 5 TABLET, FILM COATED ORAL at 09:40

## 2020-11-08 RX ADMIN — BUDESONIDE SCH MG: 0.5 SUSPENSION RESPIRATORY (INHALATION) at 07:55

## 2020-11-08 RX ADMIN — FAMOTIDINE SCH MG: 20 TABLET, FILM COATED ORAL at 22:24

## 2020-11-08 RX ADMIN — METOPROLOL TARTRATE SCH MG: 50 TABLET, FILM COATED ORAL at 09:39

## 2020-11-08 RX ADMIN — DOCUSATE SODIUM SCH: 100 CAPSULE, LIQUID FILLED ORAL at 11:39

## 2020-11-08 RX ADMIN — AZITHROMYCIN MONOHYDRATE SCH MLS/HR: 500 INJECTION, POWDER, LYOPHILIZED, FOR SOLUTION INTRAVENOUS at 12:08

## 2020-11-08 RX ADMIN — Medication SCH ML: at 22:24

## 2020-11-08 RX ADMIN — Medication SCH MG: at 12:08

## 2020-11-08 RX ADMIN — BUDESONIDE SCH MG: 0.5 SUSPENSION RESPIRATORY (INHALATION) at 14:02

## 2020-11-08 RX ADMIN — IPRATROPIUM BROMIDE AND ALBUTEROL SULFATE SCH ML: 2.5; .5 SOLUTION RESPIRATORY (INHALATION) at 02:31

## 2020-11-08 RX ADMIN — ESCITALOPRAM OXALATE SCH MG: 10 TABLET, FILM COATED ORAL at 09:40

## 2020-11-08 NOTE — PDOC CRITICAL CARE PROG REPORT
General


Date:: 11/08/20


ICU Day:: 4


Hospital Day:: 4


Resuscitation Status: Full Code


Events in the past 12 to 24 Hours:: 





This 55-year-old  male smoker presented to [facility] 

emergency department on 11/4/2020 with increasing shortness of breath and 

increasing oxygen requirement.  He was initially admitted to the hospitalist 

service; however, we were contacted by Dr. Back with the request to admit to the

ICU, when the patient was assessed to be BiPAP dependent with an FiO2 of 80%.





11/6: The patient has had a positive response to diuresis.  He did not require 

BiPAP therapy; however, he did benefit from CPAP treatment yesterday.  He was 

able to tolerate CPAP 8, FiO2 90% and eventually weaned down to high flow nasal 

cannula.  During the evening, his obstructive sleep apnea was "rediagnosed", at 

which time he was placed back on CPAP therapy.  He is back on high flow nasal 

cannula this morning.  WBC 21.7 today, on steroids.  Awake, alert, oriented x3. 

In good spirits.





11/7: Slept on CPAP overnight.  The patient had 2 episodes of diarrhea 

yesterday.  C. difficile was checked and found to be positive.  Started on p.o. 

vancomycin.  Back on high flow nasal cannula during awake hours.  Chest x-ray 

shows no significant change.  Monitor shows sinus bradycardia, heart rate 55.





11/8: Slept with CPAP overnight.  Apparently, FiO2 titration has not been 

performed.  At the bedside, FiO2 titrated down to 50%.  The patient is 

tolerating p.o. vancomycin for C. difficile associated diarrhea.  Back on high 

flow nasal cannula during awake hours -17.5 LPM, FiO2 100%.  Sputum culture 

(11/5) isolated normal stepan and yeast (not Candida albicans).  Blood cultures 

(11/4) isolated gram-positive cocci in clusters and gram-positive rods in 1 of 2

bottles.


Review of systems relevant to events:: 





Respiratory: Dyspnea


Cardiovascular: Lower extremity edema


Reason for ICU Addmission:: COPD exacerbation, CHF





- Medications:


Medications reviewed and adjusted accordingly: Yes





Physical Exam


Vital Signs: 


                                        











Temp Pulse Resp BP Pulse Ox


 


 98.2 F   82   18   169/81 H  89 L


 


 11/08/20 10:00  11/08/20 07:55  11/08/20 10:36  11/08/20 10:36  11/08/20 10:36








                                 Intake & Output











 11/07/20 11/08/20 11/09/20





 06:59 06:59 06:59


 


Intake Total 1100 350 400


 


Output Total 2005 2075 760


 


Balance -905 -1728 150


 


Weight 117.6 kg 119.1 kg 








                                  Weight/Height





Weight                           119.1 kg


Height                           1.73 m








General appearance: PRESENT: no acute distress, well-developed, well-nourished


Head exam: PRESENT: atraumatic, normocephalic


Mouth exam: PRESENT: moist, tongue midline


Respiratory exam: PRESENT: crackles, decreased breath sounds, prolonged expir

atory phas, symmetrical, unlabored, wheezes.  ABSENT: rales, rhonchi


Cardiovascular exam: PRESENT: bradycardia, RRR.  ABSENT: diastolic murmur, rubs,

systolic murmur


Pulses: PRESENT: normal dorsalis pedis pul


GI/Abdominal exam: PRESENT: normal bowel sounds, soft.  ABSENT: distended, 

guarding, mass, organolmegaly, rebound, tenderness


Extremities exam: PRESENT: full ROM, pedal edema.  ABSENT: calf tenderness, 

clubbing


Musculoskeletal exam: PRESENT: normal inspection.  ABSENT: deformity


Neurological exam: PRESENT: alert, awake, oriented to person, oriented to place,

oriented to time, oriented to situation, CN II-XII grossly intact.  ABSENT: ziyad

r sensory deficit


Psychiatric exam: ABSENT: agitated, anxious


Skin exam: PRESENT: dry, intact, warm.  ABSENT: cyanosis, rash





Laboratory/Radiographs


Laboratory Results: 


                                        





                                 11/08/20 04:28 





                                 11/08/20 04:28 





                                        











  11/08/20 11/08/20





  04:28 04:28


 


WBC   19.7 H


 


RBC   5.04


 


Hgb   17.1 H


 


Hct   50.1


 


MCV   99 H


 


MCH   34.0 H


 


MCHC   34.1


 


RDW   13.6


 


Plt Count   297


 


Seg Neutrophils %   Not Reportable


 


Sodium  137.5 


 


Potassium  4.9 


 


Chloride  94 L 


 


Carbon Dioxide  37 H 


 


Anion Gap  7 


 


BUN  45 H 


 


Creatinine  1.12 


 


Est GFR (African Amer)  > 60 


 


Glucose  228 H 


 


Calcium  9.2 


 


Magnesium  2.8 H 








                                        





11/04/20 21:47   Blood   Blood Culture (PCR) - Final


                            Staphylococcus Species


11/05/20 13:10   Sputum   Gram Stain - Final


11/05/20 13:10   Sputum   Sputum Culture - Final


                            Yeast, Not Candida Albicans


                            Normal Stepan





                                        











  11/04/20 11/04/20 11/05/20





  14:55 14:55 03:42


 


Creatine Kinase  187 H  


 


Troponin I   < 0.012  < 0.012


 


NT-Pro-B Natriuret Pep    185 H











Impressions: 


                                        





Chest X-Ray  11/07/20 05:00


IMPRESSION:  Severe bullous emphysema in the left lung with compressive 

atelectasis at the bases.


No change.


 











All labs, radiographs, diagnostic studies and EKGs were personally reviewed: Yes


In addition, reports of radiographic and diagnostic studies were read: Yes





Assessment and Plan





- Diagnosis


(1) Acute and chronic respiratory failure (acute-on-chronic)


Qualifiers: 


   Respiratory failure complication: hypoxia   Qualified Code(s): J96.21 - Acute

and chronic respiratory failure with hypoxia   


Is this a current diagnosis for this admission?: Yes   


Plan: 





* Continue high flow nasal cannula during the day.  Maintain FiO2 100%.  Wean 

  flow rate as tolerated.


* CPAP 8 nightly.  Titrate FiO2 to maintain SPO2 89-93%.


* Continue steroids.


* Diurese today.











(2) Paroxysmal atrial fibrillation


Is this a current diagnosis for this admission?: Yes   


Plan: 





* Continue Eliquis.


* On (home med) amiodarone.








(3) Bullous emphysema


Is this a current diagnosis for this admission?: Yes   


Plan: 


DuoNeb/budesonide scheduled.








(4) C. difficile diarrhea


Is this a current diagnosis for this admission?: Yes   


Plan: 


On vancomycin 125 mg p.o. every 6 hours.








(5) Pneumonia


Qualifiers: 


   Pneumonia type: due to unspecified organism   Laterality: right   Lung 

location: lower lobe of lung   Qualified Code(s): J18.9 - Pneumonia, unspecified

organism   


Is this a current diagnosis for this admission?: Yes   


Plan: 





* On Rocephin/Zithromax.  We will stop after 5-day course in the absence of 

  compelling indication to continue antibiotic therapy.


* Trach aspirate isolated in normal respiratory stepan.


* CBC in a.m.


.








(6) Cigarette smoker


Is this a current diagnosis for this admission?: Yes   


Plan Summary: 





OK to transfer to [facility] with continuous pulse oximetry from pulmonary standpoint.





Critical Time


Critical Time (minutes): 60


Level of Care: ICU


-: 


1.  The care of a critical patient is a dynamic process.  This note is a 

representative synopsis but static in nature.  The timeframe for treatments 

given in order is not necessarily the actual time these treatments may have been

done.





2.  This patient requires critical care secondary to ongoing requirements for 

therapy not offered or safe outside the critical care environment.  Transfer to 

a lower level of care will result in altered life or limb morbidity and 

mortality.





3.  Multidisciplinary rounds completed.





4.  ABCDE bundle addressed.

## 2020-11-09 LAB
ANION GAP SERPL CALC-SCNC: 9 MMOL/L (ref 5–19)
BUN SERPL-MCNC: 40 MG/DL (ref 7–20)
CALCIUM: 9 MG/DL (ref 8.4–10.2)
CHLORIDE SERPL-SCNC: 93 MMOL/L (ref 98–107)
CO2 SERPL-SCNC: 34 MMOL/L (ref 22–30)
GLUCOSE SERPL-MCNC: 225 MG/DL (ref 75–110)
POTASSIUM SERPL-SCNC: 4.6 MMOL/L (ref 3.6–5)

## 2020-11-09 RX ADMIN — Medication SCH MG: at 23:00

## 2020-11-09 RX ADMIN — ESCITALOPRAM OXALATE SCH MG: 10 TABLET, FILM COATED ORAL at 09:26

## 2020-11-09 RX ADMIN — Medication SCH MG: at 17:05

## 2020-11-09 RX ADMIN — BUDESONIDE SCH MG: 0.5 SUSPENSION RESPIRATORY (INHALATION) at 21:44

## 2020-11-09 RX ADMIN — ASPIRIN SCH MG: 81 TABLET, COATED ORAL at 09:26

## 2020-11-09 RX ADMIN — CEFEPIME HYDROCHLORIDE SCH MLS/HR: 1 INJECTION, SOLUTION INTRAVENOUS at 22:19

## 2020-11-09 RX ADMIN — IPRATROPIUM BROMIDE AND ALBUTEROL SULFATE SCH ML: 2.5; .5 SOLUTION RESPIRATORY (INHALATION) at 21:44

## 2020-11-09 RX ADMIN — Medication SCH MG: at 11:43

## 2020-11-09 RX ADMIN — METHYLPREDNISOLONE SODIUM SUCCINATE SCH MG: 125 INJECTION, POWDER, FOR SOLUTION INTRAMUSCULAR; INTRAVENOUS at 09:25

## 2020-11-09 RX ADMIN — Medication SCH MG: at 05:00

## 2020-11-09 RX ADMIN — AMIODARONE HYDROCHLORIDE SCH MG: 200 TABLET ORAL at 21:50

## 2020-11-09 RX ADMIN — METHYLPREDNISOLONE SODIUM SUCCINATE SCH MG: 125 INJECTION, POWDER, FOR SOLUTION INTRAMUSCULAR; INTRAVENOUS at 21:50

## 2020-11-09 RX ADMIN — CLOBETASOL PROPIONATE SCH APPLIC: 0.5 OINTMENT TOPICAL at 09:26

## 2020-11-09 RX ADMIN — APIXABAN SCH MG: 5 TABLET, FILM COATED ORAL at 09:25

## 2020-11-09 RX ADMIN — APIXABAN SCH MG: 5 TABLET, FILM COATED ORAL at 21:49

## 2020-11-09 RX ADMIN — METOPROLOL TARTRATE SCH MG: 50 TABLET, FILM COATED ORAL at 21:49

## 2020-11-09 RX ADMIN — Medication SCH ML: at 05:00

## 2020-11-09 RX ADMIN — Medication SCH: at 14:11

## 2020-11-09 RX ADMIN — AMIODARONE HYDROCHLORIDE SCH MG: 200 TABLET ORAL at 09:25

## 2020-11-09 RX ADMIN — CEFEPIME HYDROCHLORIDE SCH MLS/HR: 1 INJECTION, SOLUTION INTRAVENOUS at 09:25

## 2020-11-09 RX ADMIN — IPRATROPIUM BROMIDE AND ALBUTEROL SULFATE SCH ML: 2.5; .5 SOLUTION RESPIRATORY (INHALATION) at 14:32

## 2020-11-09 RX ADMIN — IPRATROPIUM BROMIDE AND ALBUTEROL SULFATE SCH ML: 2.5; .5 SOLUTION RESPIRATORY (INHALATION) at 02:50

## 2020-11-09 RX ADMIN — BUDESONIDE SCH MG: 0.5 SUSPENSION RESPIRATORY (INHALATION) at 02:50

## 2020-11-09 RX ADMIN — FAMOTIDINE SCH MG: 20 TABLET, FILM COATED ORAL at 09:25

## 2020-11-09 RX ADMIN — BUDESONIDE SCH MG: 0.5 SUSPENSION RESPIRATORY (INHALATION) at 09:36

## 2020-11-09 RX ADMIN — METOPROLOL TARTRATE SCH MG: 50 TABLET, FILM COATED ORAL at 09:25

## 2020-11-09 RX ADMIN — FUROSEMIDE SCH: 10 INJECTION, SOLUTION INTRAMUSCULAR; INTRAVENOUS at 09:05

## 2020-11-09 RX ADMIN — BUDESONIDE SCH MG: 0.5 SUSPENSION RESPIRATORY (INHALATION) at 14:32

## 2020-11-09 RX ADMIN — FAMOTIDINE SCH MG: 20 TABLET, FILM COATED ORAL at 21:49

## 2020-11-09 RX ADMIN — Medication SCH ML: at 21:50

## 2020-11-09 RX ADMIN — AZITHROMYCIN MONOHYDRATE SCH MLS/HR: 500 INJECTION, POWDER, LYOPHILIZED, FOR SOLUTION INTRAVENOUS at 12:38

## 2020-11-09 RX ADMIN — ATORVASTATIN CALCIUM SCH MG: 10 TABLET, FILM COATED ORAL at 21:50

## 2020-11-09 RX ADMIN — IPRATROPIUM BROMIDE AND ALBUTEROL SULFATE SCH ML: 2.5; .5 SOLUTION RESPIRATORY (INHALATION) at 09:36

## 2020-11-09 NOTE — PDOC CRITICAL CARE PROG REPORT
General


Date:: 11/09/20


ICU Day:: 4


Hospital Day:: 5


Resuscitation Status: Full Code


Events in the past 12 to 24 Hours:: 





On high flow and CPAP. Trying simple mask today.


Review of systems relevant to events:: 





Pulmonary.


Reason for ICU Addmission:: COPD exacerbation, CHF





- Medications:


Medications reviewed and adjusted accordingly: Yes


Vasopressors:: 





None


Sedation:: 





None





Physical Exam


Vital Signs: 


                                        











Temp Pulse Resp BP Pulse Ox


 


 98.7 F   57 L  16   136/83 H  88 L


 


 11/09/20 08:00  11/09/20 07:00  11/09/20 08:00  11/09/20 07:12  11/09/20 08:00








                                 Intake & Output











 11/08/20 11/09/20 11/10/20





 06:59 06:59 06:59


 


Intake Total 350 1900 


 


Output Total 2075 3104 


 


Balance -1725 -1204 


 


Weight 119.1 kg 119 kg 








                                  Weight/Height





Weight                           119 kg


Height                           5 ft 8 in








General appearance: PRESENT: no acute distress, cooperative


Head exam: PRESENT: atraumatic, normocephalic


Eye exam: PRESENT: conjunctiva pink, EOMI, PERRLA.  ABSENT: scleral icterus


Ear exam: PRESENT: normal external ear exam


Mouth exam: PRESENT: moist, tongue midline


Respiratory exam: PRESENT: clear to auscultation judith, decreased breath sounds.  

ABSENT: rales, rhonchi, wheezes


Cardiovascular exam: PRESENT: RRR.  ABSENT: diastolic murmur, rubs, systolic 

murmur


GI/Abdominal exam: PRESENT: normal bowel sounds, soft.  ABSENT: distended, 

guarding, mass, organolmegaly, rebound, tenderness


Rectal exam: PRESENT: deferred


Extremities exam: PRESENT: full ROM.  ABSENT: calf tenderness, clubbing, pedal 

edema


Musculoskeletal exam: PRESENT: normal inspection


Neurological exam: PRESENT: alert, awake, oriented to person, oriented to place,

oriented to time, oriented to situation, CN II-XII grossly intact.  ABSENT: 

motor sensory deficit


Psychiatric exam: PRESENT: appropriate affect, normal mood.  ABSENT: homicidal 

ideation, suicidal ideation


Skin exam: PRESENT: dry, intact, warm.  ABSENT: cyanosis, rash





Laboratory/Radiographs


Laboratory Results: 


                                        





                                 11/08/20 04:28 





                                 11/09/20 04:28 





                                        











  11/08/20 11/09/20





  12:05 04:28


 


Carbonic Acid  1.47 H 


 


HCO3/H2CO3 Ratio  22:1 


 


ABG pH  7.46 H 


 


ABG pCO2  49.0 H 


 


ABG pO2  44.8 L 


 


ABG HCO3  33.7 H 


 


ABG O2 Saturation  82.5 L 


 


ABG Base Excess  8.0 


 


FiO2  100% 


 


Sodium   135.9 L


 


Potassium   4.6


 


Chloride   93 L


 


Carbon Dioxide   34 H


 


Anion Gap   9


 


BUN   40 H


 


Creatinine   0.96


 


Est GFR (African Amer)   > 60


 


Glucose   225 H


 


Calcium   9.0


 


Magnesium   2.7 H








                                        





11/04/20 21:47   Blood   Blood Culture (PCR) - Final


                            Staphylococcus Species


11/04/20 21:47   Blood   Blood Culture - Final


                            Staphylococcus Epidermidis


                            Corynebacterium Species





                                        











  11/04/20 11/04/20 11/05/20





  14:55 14:55 03:42


 


Creatine Kinase  187 H  


 


Troponin I   < 0.012  < 0.012


 


NT-Pro-B Natriuret Pep    185 H











Impressions: 


                                        





Chest X-Ray  11/07/20 05:00


IMPRESSION:  Severe bullous emphysema in the left lung with compressive 

atelectasis at the bases.


No change.


 











All labs, radiographs, diagnostic studies and EKGs were personally reviewed: Yes


In addition, reports of radiographic and diagnostic studies were read: Yes





Assessment and Plan





- Diagnosis


(1) Acute on chronic respiratory failure with hypoxemia


Is this a current diagnosis for this admission?: Yes   


Plan: 


He has not been hypercarbic. He has been somewhat hypoxic but only to an O2 

saturation of 89-92%. He is doing well on high flow, will try simple mask. CPAP 

at night for REGLA. 





11/5 Diuresesd and did not need bipap.





11/6 Slept on CPAP and did well.





11/7 Found to have Cdif. No diarrhea or abd pain today. On PO vancomycin.








(2) C. difficile diarrhea


Is this a current diagnosis for this admission?: Yes   


Plan: 


PO vancomycin. No pain or diarrhea.








(3) Paroxysmal atrial fibrillation


Is this a current diagnosis for this admission?: Yes   


Plan: 


On amiodarone at home dose.








(4) COPD exacerbation


Is this a current diagnosis for this admission?: Yes   


Plan: 


He is on steroids. Not wheezing. Start prednisone tomorrow.








(5) Obesity (BMI 30-39.9)


Is this a current diagnosis for this admission?: Yes   


Plan: 


Chronic





Plan Summary: 





Will try simple mask today and downgrade.





Critical Time


Critical Time (minutes): 35


Level of Care: IMCU


Anticipated discharge: Home


Anticipated DC Timeframe: Other


-: 


1.  The care of a critical patient is a dynamic process.  This note is a 

representative synopsis but static in nature.  The timeframe for treatments 

given in order is not necessarily the actual time these treatments may have been

done.





2.  This patient requires critical care secondary to ongoing requirements for 

therapy not offered or safe outside the critical care environment.  Transfer to 

a lower level of care will result in altered life or limb morbidity and 

mortality.





3.  Multidisciplinary rounds completed.





4.  ABCDE bundle addressed.

## 2020-11-10 RX ADMIN — ASPIRIN SCH MG: 81 TABLET, COATED ORAL at 10:17

## 2020-11-10 RX ADMIN — BUDESONIDE SCH MG: 0.5 SUSPENSION RESPIRATORY (INHALATION) at 14:27

## 2020-11-10 RX ADMIN — METHYLPREDNISOLONE SODIUM SUCCINATE SCH MG: 125 INJECTION, POWDER, FOR SOLUTION INTRAMUSCULAR; INTRAVENOUS at 10:17

## 2020-11-10 RX ADMIN — ATORVASTATIN CALCIUM SCH MG: 10 TABLET, FILM COATED ORAL at 21:36

## 2020-11-10 RX ADMIN — METOPROLOL TARTRATE SCH MG: 50 TABLET, FILM COATED ORAL at 21:36

## 2020-11-10 RX ADMIN — BUDESONIDE SCH MG: 0.5 SUSPENSION RESPIRATORY (INHALATION) at 02:50

## 2020-11-10 RX ADMIN — FUROSEMIDE SCH: 10 INJECTION, SOLUTION INTRAMUSCULAR; INTRAVENOUS at 10:38

## 2020-11-10 RX ADMIN — Medication SCH MG: at 11:49

## 2020-11-10 RX ADMIN — ESCITALOPRAM OXALATE SCH MG: 10 TABLET, FILM COATED ORAL at 10:18

## 2020-11-10 RX ADMIN — Medication SCH ML: at 21:37

## 2020-11-10 RX ADMIN — Medication SCH MG: at 18:00

## 2020-11-10 RX ADMIN — Medication SCH MG: at 23:23

## 2020-11-10 RX ADMIN — FAMOTIDINE SCH MG: 20 TABLET, FILM COATED ORAL at 21:36

## 2020-11-10 RX ADMIN — Medication SCH ML: at 05:15

## 2020-11-10 RX ADMIN — AMIODARONE HYDROCHLORIDE SCH MG: 200 TABLET ORAL at 21:36

## 2020-11-10 RX ADMIN — BUDESONIDE SCH MG: 0.5 SUSPENSION RESPIRATORY (INHALATION) at 20:46

## 2020-11-10 RX ADMIN — AZITHROMYCIN MONOHYDRATE SCH MLS/HR: 500 INJECTION, POWDER, LYOPHILIZED, FOR SOLUTION INTRAVENOUS at 11:50

## 2020-11-10 RX ADMIN — IPRATROPIUM BROMIDE AND ALBUTEROL SULFATE SCH ML: 2.5; .5 SOLUTION RESPIRATORY (INHALATION) at 20:46

## 2020-11-10 RX ADMIN — IPRATROPIUM BROMIDE AND ALBUTEROL SULFATE SCH ML: 2.5; .5 SOLUTION RESPIRATORY (INHALATION) at 02:51

## 2020-11-10 RX ADMIN — Medication SCH MG: at 05:15

## 2020-11-10 RX ADMIN — METHYLPREDNISOLONE SODIUM SUCCINATE SCH MG: 125 INJECTION, POWDER, FOR SOLUTION INTRAMUSCULAR; INTRAVENOUS at 21:36

## 2020-11-10 RX ADMIN — CLOBETASOL PROPIONATE SCH APPLIC: 0.5 OINTMENT TOPICAL at 11:50

## 2020-11-10 RX ADMIN — AMIODARONE HYDROCHLORIDE SCH MG: 200 TABLET ORAL at 10:17

## 2020-11-10 RX ADMIN — BUDESONIDE SCH MG: 0.5 SUSPENSION RESPIRATORY (INHALATION) at 08:04

## 2020-11-10 RX ADMIN — FAMOTIDINE SCH MG: 20 TABLET, FILM COATED ORAL at 10:17

## 2020-11-10 RX ADMIN — CEFEPIME HYDROCHLORIDE SCH MLS/HR: 1 INJECTION, SOLUTION INTRAVENOUS at 21:37

## 2020-11-10 RX ADMIN — APIXABAN SCH MG: 5 TABLET, FILM COATED ORAL at 21:36

## 2020-11-10 RX ADMIN — APIXABAN SCH MG: 5 TABLET, FILM COATED ORAL at 10:18

## 2020-11-10 RX ADMIN — IPRATROPIUM BROMIDE AND ALBUTEROL SULFATE SCH ML: 2.5; .5 SOLUTION RESPIRATORY (INHALATION) at 08:04

## 2020-11-10 RX ADMIN — METOPROLOL TARTRATE SCH MG: 50 TABLET, FILM COATED ORAL at 10:18

## 2020-11-10 RX ADMIN — CEFEPIME HYDROCHLORIDE SCH MLS/HR: 1 INJECTION, SOLUTION INTRAVENOUS at 10:18

## 2020-11-10 RX ADMIN — IPRATROPIUM BROMIDE AND ALBUTEROL SULFATE SCH ML: 2.5; .5 SOLUTION RESPIRATORY (INHALATION) at 14:27

## 2020-11-10 RX ADMIN — Medication SCH: at 13:06

## 2020-11-10 NOTE — PDOC PROGRESS REPORT
Subjective


Date:: 11/10/20


Subjective:: 





No adverse events overnight.  No new complaints.  He is napping a lot during the

day.  He is on 12 L of oxygen and looks comfortable.  He says he wears 5 L of 

oxygen at home.  He has not had any bowel movements today.


Reason For Visit: 


ACUTE AND CHRONIC RESPIRATORY FAILURE(ACUTE-ON








Physical Exam


Vital Signs: 


                                        











Temp Pulse Resp BP Pulse Ox


 


 98.1 F   62   20   123/95 H  94 


 


 11/10/20 08:29  11/10/20 14:27  11/10/20 14:27  11/10/20 08:29  11/10/20 14:27








                                 Intake & Output











 11/09/20 11/10/20 11/11/20





 06:59 06:59 06:59


 


Intake Total 1900 1112 1017


 


Output Total 3104 750 400


 


Balance -1204 362 617


 


Weight 119 kg 116.2 kg 











General appearance: PRESENT: no acute distress, cooperative, disheveled, 

morbidly obese


Teeth exam: PRESENT: poor dentation


Respiratory exam: PRESENT: decreased breath sounds, prolonged expiratory phas, 

symmetrical, unlabored, wheezes - Faint end expiratory.  ABSENT: accessory 

muscle use, chest wall tenderness, crackles, rhonchi, tachypnea


Cardiovascular exam: PRESENT: RRR, +S1, +S2


Pulses: PRESENT: normal carotid pulses


Vascular exam: PRESENT: normal capillary refill


GI/Abdominal exam: PRESENT: normal bowel sounds, soft, other - Pendulous 

abdominal pannus.  ABSENT: distended, guarding, rebound, tenderness


Extremities exam: ABSENT: clubbing, pedal edema


Musculoskeletal exam: PRESENT: normal inspection.  ABSENT: deformity


Neurological exam: PRESENT: awake, oriented to person, oriented to place, 

oriented to situation


Psychiatric exam: PRESENT: flat affect


Skin exam: PRESENT: dry, warm





Results


Laboratory Results: 


                                        





                                 11/08/20 04:28 





                                 11/09/20 04:28 





                                        





11/04/20 14:55   Blood   Blood Culture - Final


                            NO GROWTH IN 5 DAYS





                                        











  11/04/20 11/04/20 11/05/20





  14:55 14:55 03:42


 


Creatine Kinase  187 H  


 


Troponin I   < 0.012  < 0.012


 


NT-Pro-B Natriuret Pep    185 H











Impressions: 


                                        





Chest X-Ray  11/07/20 05:00


IMPRESSION:  Severe bullous emphysema in the left lung with compressive 

atelectasis at the bases.


No change.


 














Assessment and Plan





- Diagnosis


(1) Acute and chronic respiratory failure (acute-on-chronic)


Qualifiers: 


   Respiratory failure complication: hypoxia   Qualified Code(s): J96.21 - Acute

and chronic respiratory failure with hypoxia   


Is this a current diagnosis for this admission?: Yes   





(2) Bullous emphysema


Is this a current diagnosis for this admission?: Yes   





(3) C. difficile diarrhea


Is this a current diagnosis for this admission?: Yes   





(4) Cigarette smoker


Is this a current diagnosis for this admission?: Yes   





(5) Paroxysmal atrial fibrillation


Is this a current diagnosis for this admission?: Yes   





(6) COPD exacerbation


Is this a current diagnosis for this admission?: Yes   





- Plan Summary


Summary: 


He has been transitioned over to prednisone.  He is on nasal cannula, currently 

at 12 L.  We will wean this down as tolerated.  He has been on oral vancomycin 

because he tested positive for C. difficile diarrhea.  He has had a very good 

response and said when I talked to him earlier that he had not yet had a bowel 

movement today.  We will continue with his oral vancomycin.  We will continue 

other supportive care.





- Time


Time Spent with patient: 15-24 minutes


Anticipated Discharge Disposition: Home with Home Health


Anticipated Discharge Timeframe: within 72 hours

## 2020-11-11 RX ADMIN — Medication SCH ML: at 21:25

## 2020-11-11 RX ADMIN — AMIODARONE HYDROCHLORIDE SCH MG: 200 TABLET ORAL at 21:26

## 2020-11-11 RX ADMIN — IPRATROPIUM BROMIDE AND ALBUTEROL SULFATE SCH ML: 2.5; .5 SOLUTION RESPIRATORY (INHALATION) at 08:00

## 2020-11-11 RX ADMIN — Medication SCH ML: at 05:13

## 2020-11-11 RX ADMIN — ASPIRIN SCH MG: 81 TABLET, COATED ORAL at 09:34

## 2020-11-11 RX ADMIN — ATORVASTATIN CALCIUM SCH MG: 10 TABLET, FILM COATED ORAL at 21:26

## 2020-11-11 RX ADMIN — BUDESONIDE SCH MG: 0.5 SUSPENSION RESPIRATORY (INHALATION) at 13:43

## 2020-11-11 RX ADMIN — METOPROLOL TARTRATE SCH MG: 50 TABLET, FILM COATED ORAL at 09:34

## 2020-11-11 RX ADMIN — CLOBETASOL PROPIONATE SCH APPLIC: 0.5 OINTMENT TOPICAL at 09:35

## 2020-11-11 RX ADMIN — Medication SCH MG: at 05:12

## 2020-11-11 RX ADMIN — AMIODARONE HYDROCHLORIDE SCH MG: 200 TABLET ORAL at 09:34

## 2020-11-11 RX ADMIN — FUROSEMIDE SCH MG: 10 INJECTION, SOLUTION INTRAMUSCULAR; INTRAVENOUS at 09:32

## 2020-11-11 RX ADMIN — IPRATROPIUM BROMIDE AND ALBUTEROL SULFATE SCH ML: 2.5; .5 SOLUTION RESPIRATORY (INHALATION) at 20:43

## 2020-11-11 RX ADMIN — BUDESONIDE SCH MG: 0.5 SUSPENSION RESPIRATORY (INHALATION) at 08:01

## 2020-11-11 RX ADMIN — APIXABAN SCH MG: 5 TABLET, FILM COATED ORAL at 21:26

## 2020-11-11 RX ADMIN — BUDESONIDE SCH MG: 0.5 SUSPENSION RESPIRATORY (INHALATION) at 01:29

## 2020-11-11 RX ADMIN — IPRATROPIUM BROMIDE AND ALBUTEROL SULFATE SCH ML: 2.5; .5 SOLUTION RESPIRATORY (INHALATION) at 01:29

## 2020-11-11 RX ADMIN — CEFEPIME HYDROCHLORIDE SCH MLS/HR: 1 INJECTION, SOLUTION INTRAVENOUS at 21:27

## 2020-11-11 RX ADMIN — Medication SCH MG: at 12:43

## 2020-11-11 RX ADMIN — ESCITALOPRAM OXALATE SCH MG: 10 TABLET, FILM COATED ORAL at 09:34

## 2020-11-11 RX ADMIN — METHYLPREDNISOLONE SODIUM SUCCINATE SCH MG: 125 INJECTION, POWDER, FOR SOLUTION INTRAMUSCULAR; INTRAVENOUS at 21:24

## 2020-11-11 RX ADMIN — METOPROLOL TARTRATE SCH MG: 50 TABLET, FILM COATED ORAL at 21:26

## 2020-11-11 RX ADMIN — Medication SCH MG: at 17:21

## 2020-11-11 RX ADMIN — FAMOTIDINE SCH MG: 20 TABLET, FILM COATED ORAL at 09:34

## 2020-11-11 RX ADMIN — IPRATROPIUM BROMIDE AND ALBUTEROL SULFATE SCH ML: 2.5; .5 SOLUTION RESPIRATORY (INHALATION) at 13:43

## 2020-11-11 RX ADMIN — BUDESONIDE SCH MG: 0.5 SUSPENSION RESPIRATORY (INHALATION) at 20:43

## 2020-11-11 RX ADMIN — APIXABAN SCH MG: 5 TABLET, FILM COATED ORAL at 09:34

## 2020-11-11 RX ADMIN — CEFEPIME HYDROCHLORIDE SCH MLS/HR: 1 INJECTION, SOLUTION INTRAVENOUS at 09:32

## 2020-11-11 RX ADMIN — FAMOTIDINE SCH MG: 20 TABLET, FILM COATED ORAL at 21:26

## 2020-11-11 RX ADMIN — METHYLPREDNISOLONE SODIUM SUCCINATE SCH MG: 125 INJECTION, POWDER, FOR SOLUTION INTRAMUSCULAR; INTRAVENOUS at 09:30

## 2020-11-11 RX ADMIN — Medication SCH ML: at 13:34

## 2020-11-11 NOTE — PDOC PROGRESS REPORT
Subjective


Date:: 11/11/20


Subjective:: 





No adverse events overnight.  No new complaints.  He said he is only had 1 bowel

movement the past 24 hours.  He said it was kind of loose but it was not watery.

 He still on 12 L on the Oxymizer.  His oxygen saturations dropped low earlier 

and it was because he was laying on the tube and had kinked it.  He said he is 

normally on about 6 L at home.


Reason For Visit: 


ACUTE AND CHRONIC RESPIRATORY FAILURE(ACUTE-ON








Physical Exam


Vital Signs: 


                                        











Temp Pulse Resp BP Pulse Ox


 


 98.5 F   65   16   118/73   98 


 


 11/11/20 11:39  11/11/20 14:00  11/11/20 13:43  11/11/20 11:39  11/11/20 13:43








                                 Intake & Output











 11/10/20 11/11/20 11/12/20





 06:59 06:59 06:59


 


Intake Total 1112 1885 406


 


Output Total 750 1950 1500


 


Balance 243 -91 -1094


 


Weight 116.2 kg 115.9 kg 








General appearance: PRESENT: no acute distress, cooperative, disheveled, 

morbidly obese


Teeth exam: PRESENT: poor dentation


Respiratory exam: PRESENT: decreased breath sounds, prolonged expiratory phas, 

symmetrical, unlabored, wheezes - Faint end expiratory.  ABSENT: accessory 

muscle use, chest wall tenderness, crackles, rhonchi, tachypnea


Cardiovascular exam: PRESENT: RRR, +S1, +S2


Pulses: PRESENT: normal carotid pulses


Vascular exam: PRESENT: normal capillary refill


GI/Abdominal exam: PRESENT: normal bowel sounds, soft, other - Pendulous 

abdominal pannus.  ABSENT: distended, guarding, rebound, tenderness


Extremities exam: ABSENT: clubbing, pedal edema


Musculoskeletal exam: PRESENT: normal inspection.  ABSENT: deformity


Neurological exam: PRESENT: awake, oriented to person, oriented to place, 

oriented to situation


Psychiatric exam: PRESENT: flat affect


Skin exam: PRESENT: dry, warm





Results


Laboratory Results: 


                                        





                                 11/08/20 04:28 





                                 11/09/20 04:28 





                                        











  11/04/20 11/04/20 11/05/20





  14:55 14:55 03:42


 


Creatine Kinase  187 H  


 


Troponin I   < 0.012  < 0.012


 


NT-Pro-B Natriuret Pep    185 H











Impressions: 


                                        





Chest X-Ray  11/07/20 05:00


IMPRESSION:  Severe bullous emphysema in the left lung with compressive 

atelectasis at the bases.


No change.


 














Assessment and Plan





- Diagnosis


(1) Acute and chronic respiratory failure (acute-on-chronic)


Qualifiers: 


   Respiratory failure complication: hypoxia   Qualified Code(s): J96.21 - Acute

and chronic respiratory failure with hypoxia   


Is this a current diagnosis for this admission?: Yes   





(2) Bullous emphysema


Is this a current diagnosis for this admission?: Yes   





(3) C. difficile diarrhea


Is this a current diagnosis for this admission?: Yes   





(4) Cigarette smoker


Is this a current diagnosis for this admission?: Yes   





(5) Paroxysmal atrial fibrillation


Is this a current diagnosis for this admission?: Yes   





(6) COPD exacerbation


Is this a current diagnosis for this admission?: Yes   





- Plan Summary


Summary: 


He has been transitioned over to prednisone.  He is on nasal cannula, currently 

at 12 L.  We will wean this down as tolerated.  He has been on oral vancomycin 

because he tested positive for C. difficile diarrhea.  He has had a very good 

response.  At this point it seems like the limiting factor here is his oxygen 

requirement.  Once we get him down closer to what he is on at home he can be 

discharged.





- Time


Time Spent with patient: 15-24 minutes


Anticipated Discharge Disposition: Home with Home Health


Anticipated Discharge Timeframe: Undetermined

## 2020-11-12 RX ADMIN — IPRATROPIUM BROMIDE AND ALBUTEROL SULFATE SCH ML: 2.5; .5 SOLUTION RESPIRATORY (INHALATION) at 20:41

## 2020-11-12 RX ADMIN — ATORVASTATIN CALCIUM SCH MG: 10 TABLET, FILM COATED ORAL at 21:25

## 2020-11-12 RX ADMIN — Medication SCH: at 14:06

## 2020-11-12 RX ADMIN — APIXABAN SCH MG: 5 TABLET, FILM COATED ORAL at 21:25

## 2020-11-12 RX ADMIN — METOPROLOL TARTRATE SCH MG: 50 TABLET, FILM COATED ORAL at 21:25

## 2020-11-12 RX ADMIN — AMIODARONE HYDROCHLORIDE SCH MG: 200 TABLET ORAL at 10:07

## 2020-11-12 RX ADMIN — Medication SCH ML: at 21:26

## 2020-11-12 RX ADMIN — Medication SCH MG: at 00:39

## 2020-11-12 RX ADMIN — FUROSEMIDE SCH MG: 10 INJECTION, SOLUTION INTRAMUSCULAR; INTRAVENOUS at 10:08

## 2020-11-12 RX ADMIN — ESCITALOPRAM OXALATE SCH MG: 10 TABLET, FILM COATED ORAL at 10:07

## 2020-11-12 RX ADMIN — METHYLPREDNISOLONE SODIUM SUCCINATE SCH MG: 125 INJECTION, POWDER, FOR SOLUTION INTRAMUSCULAR; INTRAVENOUS at 10:07

## 2020-11-12 RX ADMIN — FAMOTIDINE SCH MG: 20 TABLET, FILM COATED ORAL at 10:07

## 2020-11-12 RX ADMIN — Medication SCH MG: at 18:37

## 2020-11-12 RX ADMIN — CLOBETASOL PROPIONATE SCH APPLIC: 0.5 OINTMENT TOPICAL at 10:08

## 2020-11-12 RX ADMIN — APIXABAN SCH MG: 5 TABLET, FILM COATED ORAL at 10:07

## 2020-11-12 RX ADMIN — IPRATROPIUM BROMIDE AND ALBUTEROL SULFATE SCH ML: 2.5; .5 SOLUTION RESPIRATORY (INHALATION) at 08:29

## 2020-11-12 RX ADMIN — Medication SCH ML: at 06:21

## 2020-11-12 RX ADMIN — IPRATROPIUM BROMIDE AND ALBUTEROL SULFATE SCH ML: 2.5; .5 SOLUTION RESPIRATORY (INHALATION) at 01:33

## 2020-11-12 RX ADMIN — FAMOTIDINE SCH MG: 20 TABLET, FILM COATED ORAL at 21:25

## 2020-11-12 RX ADMIN — AMIODARONE HYDROCHLORIDE SCH MG: 200 TABLET ORAL at 21:25

## 2020-11-12 RX ADMIN — METOPROLOL TARTRATE SCH MG: 50 TABLET, FILM COATED ORAL at 10:07

## 2020-11-12 RX ADMIN — Medication SCH MG: at 12:59

## 2020-11-12 RX ADMIN — BUDESONIDE SCH MG: 0.5 SUSPENSION RESPIRATORY (INHALATION) at 01:34

## 2020-11-12 RX ADMIN — Medication SCH MG: at 06:20

## 2020-11-12 RX ADMIN — BUDESONIDE SCH MG: 0.5 SUSPENSION RESPIRATORY (INHALATION) at 08:30

## 2020-11-12 RX ADMIN — BUDESONIDE SCH MG: 0.5 SUSPENSION RESPIRATORY (INHALATION) at 13:58

## 2020-11-12 RX ADMIN — BUDESONIDE SCH MG: 0.5 SUSPENSION RESPIRATORY (INHALATION) at 20:41

## 2020-11-12 RX ADMIN — IPRATROPIUM BROMIDE AND ALBUTEROL SULFATE SCH ML: 2.5; .5 SOLUTION RESPIRATORY (INHALATION) at 13:58

## 2020-11-12 RX ADMIN — METHYLPREDNISOLONE SODIUM SUCCINATE SCH MG: 125 INJECTION, POWDER, FOR SOLUTION INTRAMUSCULAR; INTRAVENOUS at 21:26

## 2020-11-12 RX ADMIN — ASPIRIN SCH MG: 81 TABLET, COATED ORAL at 10:07

## 2020-11-12 NOTE — PDOC PROGRESS REPORT
Subjective


Date:: 11/12/20


Subjective:: 





No adverse events overnight.  No new complaints.  He is feeling pretty good over

all.  He is only having approximately 1 bowel movement a day at this point.  He 

was down to 9.5 L on the Oxymizer.


Reason For Visit: 


ACUTE AND CHRONIC RESPIRATORY FAILURE(ACUTE-ON








Physical Exam


Vital Signs: 


                                        











Temp Pulse Resp BP Pulse Ox


 


 98.7 F   62   18   125/79   96 


 


 11/12/20 12:19  11/12/20 14:01  11/12/20 14:01  11/12/20 12:19  11/12/20 14:01








                                 Intake & Output











 11/11/20 11/12/20 11/13/20





 06:59 06:59 06:59


 


Intake Total 1885 1382 360


 


Output Total 1950 2850 900


 


Balance -65 -1468 -540


 


Weight 115.9 kg 115.7 kg 








General appearance: PRESENT: no acute distress, cooperative, disheveled, 

morbidly obese


Teeth exam: PRESENT: poor dentation


Respiratory exam: PRESENT: decreased breath sounds, prolonged expiratory phas, 

symmetrical, unlabored, wheezes - Faint end expiratory.  ABSENT: accessory 

muscle use, chest wall tenderness, crackles, rhonchi, tachypnea


Cardiovascular exam: PRESENT: RRR, +S1, +S2


Pulses: PRESENT: normal carotid pulses


Vascular exam: PRESENT: normal capillary refill


GI/Abdominal exam: PRESENT: normal bowel sounds, soft, other - Pendulous 

abdominal pannus.  ABSENT: distended, guarding, rebound, tenderness


Extremities exam: ABSENT: clubbing, pedal edema


Musculoskeletal exam: PRESENT: normal inspection.  ABSENT: deformity


Neurological exam: PRESENT: awake, oriented to person, oriented to place, 

oriented to situation


Psychiatric exam: PRESENT: flat affect


Skin exam: PRESENT: dry, warm





Results


Laboratory Results: 


                                        





                                 11/08/20 04:28 





                                 11/09/20 04:28 





                                        











  11/04/20 11/04/20 11/05/20





  14:55 14:55 03:42


 


Creatine Kinase  187 H  


 


Troponin I   < 0.012  < 0.012


 


NT-Pro-B Natriuret Pep    185 H











Impressions: 


                                        





Chest X-Ray  11/07/20 05:00


IMPRESSION:  Severe bullous emphysema in the left lung with compressive 

atelectasis at the bases.


No change.


 














Assessment and Plan





- Diagnosis


(1) Acute and chronic respiratory failure (acute-on-chronic)


Qualifiers: 


   Respiratory failure complication: hypoxia   Qualified Code(s): J96.21 - Acute

and chronic respiratory failure with hypoxia   


Is this a current diagnosis for this admission?: Yes   





(2) Bullous emphysema


Is this a current diagnosis for this admission?: Yes   





(3) C. difficile diarrhea


Is this a current diagnosis for this admission?: Yes   





(4) Cigarette smoker


Is this a current diagnosis for this admission?: Yes   





(5) Paroxysmal atrial fibrillation


Is this a current diagnosis for this admission?: Yes   





(6) COPD exacerbation


Is this a current diagnosis for this admission?: Yes   





- Plan Summary


Summary: 


He has been transitioned over to prednisone.  He is on nasal cannula, currently 

at 9.5 L.  We will wean this down as tolerated.  He has been on oral vancomycin 

because he tested positive for C. difficile diarrhea.  He has had a very good 

response.  At this point it seems like the limiting factor here is his oxygen 

requirement.  Once we get him down closer to what he is on at home he can be 

discharged.  If we can wean him down some more this could be tomorrow.





- Time


Time Spent with patient: 15-24 minutes


Anticipated Discharge Disposition: Home with Home Health


Anticipated Discharge Timeframe: within 48 hours

## 2020-11-13 VITALS — DIASTOLIC BLOOD PRESSURE: 77 MMHG | SYSTOLIC BLOOD PRESSURE: 135 MMHG

## 2020-11-13 RX ADMIN — IPRATROPIUM BROMIDE AND ALBUTEROL SULFATE SCH ML: 2.5; .5 SOLUTION RESPIRATORY (INHALATION) at 08:56

## 2020-11-13 RX ADMIN — APIXABAN SCH MG: 5 TABLET, FILM COATED ORAL at 09:44

## 2020-11-13 RX ADMIN — Medication SCH: at 14:39

## 2020-11-13 RX ADMIN — METHYLPREDNISOLONE SODIUM SUCCINATE SCH MG: 125 INJECTION, POWDER, FOR SOLUTION INTRAMUSCULAR; INTRAVENOUS at 09:45

## 2020-11-13 RX ADMIN — CLOBETASOL PROPIONATE SCH: 0.5 OINTMENT TOPICAL at 10:01

## 2020-11-13 RX ADMIN — ESCITALOPRAM OXALATE SCH MG: 10 TABLET, FILM COATED ORAL at 09:44

## 2020-11-13 RX ADMIN — Medication SCH MG: at 06:49

## 2020-11-13 RX ADMIN — Medication SCH ML: at 06:53

## 2020-11-13 RX ADMIN — FUROSEMIDE SCH MG: 10 INJECTION, SOLUTION INTRAMUSCULAR; INTRAVENOUS at 09:45

## 2020-11-13 RX ADMIN — BUDESONIDE SCH MG: 0.5 SUSPENSION RESPIRATORY (INHALATION) at 08:56

## 2020-11-13 RX ADMIN — ASPIRIN SCH MG: 81 TABLET, COATED ORAL at 09:44

## 2020-11-13 RX ADMIN — Medication SCH MG: at 12:59

## 2020-11-13 RX ADMIN — IPRATROPIUM BROMIDE AND ALBUTEROL SULFATE SCH ML: 2.5; .5 SOLUTION RESPIRATORY (INHALATION) at 02:10

## 2020-11-13 RX ADMIN — Medication SCH MG: at 00:46

## 2020-11-13 RX ADMIN — METOPROLOL TARTRATE SCH MG: 50 TABLET, FILM COATED ORAL at 09:45

## 2020-11-13 RX ADMIN — IPRATROPIUM BROMIDE AND ALBUTEROL SULFATE SCH ML: 2.5; .5 SOLUTION RESPIRATORY (INHALATION) at 13:29

## 2020-11-13 RX ADMIN — BUDESONIDE SCH MG: 0.5 SUSPENSION RESPIRATORY (INHALATION) at 13:29

## 2020-11-13 RX ADMIN — AMIODARONE HYDROCHLORIDE SCH MG: 200 TABLET ORAL at 09:44

## 2020-11-13 RX ADMIN — FAMOTIDINE SCH MG: 20 TABLET, FILM COATED ORAL at 09:44

## 2020-11-13 RX ADMIN — BUDESONIDE SCH MG: 0.5 SUSPENSION RESPIRATORY (INHALATION) at 02:10

## 2020-11-13 NOTE — PDOC DISCHARGE SUMMARY
Impression





- Admit/DC Date/PCP


Admission Date/Primary Care Provider: 


  11/04/20 17:13





  JOHN TRIMBLE MD





Discharge Date: 11/13/20





- Discharge Diagnosis


(1) Acute and chronic respiratory failure (acute-on-chronic)


Is this a current diagnosis for this admission?: Yes   





(2) Bullous emphysema


Is this a current diagnosis for this admission?: Yes   





(3) C. difficile diarrhea


Is this a current diagnosis for this admission?: Yes   





(4) Cigarette smoker


Is this a current diagnosis for this admission?: Yes   





(5) Paroxysmal atrial fibrillation


Is this a current diagnosis for this admission?: Yes   





(6) COPD exacerbation


Is this a current diagnosis for this admission?: Yes   





- Assessment


Summary: 


He has been transitioned over to prednisone.  He is on nasal cannula, currently 

at 9.5 L.  We will wean this down as tolerated.  He has been on oral vancomycin 

because he tested positive for C. difficile diarrhea.  He has had a very good 

response.  At this point it seems like the limiting factor here is his oxygen 

requirement.  Once we get him down closer to what he is on at home he can be 

discharged.  If we can wean him down some more this could be tomorrow.





- Additional Information


Resuscitation Status: Full Code


Discharge Diet: Cardiac


Discharge Activity: Energy Conservation, Slowly Increase Activity


Referrals: 


JOHN TRIMBLE MD [Primary Care Provider] - 11/25/20 9:30 am


Prescriptions: 


Prednisone [Deltasone 20 mg Tablet] 40 mg PO DAILY #10 tablet


Vancomycin HCl [Vancocin Inj 500 mg Vial] 125 mg PO Q6 #8 vial


Home Medications: 








Aspirin [Aspirin EC] 81 mg PO DAILY 01/19/18 


Atorvastatin Calcium [Lipitor 10 mg Tablet] 10 mg PO QHS 01/19/18 


Budesonide/Formoterol Fumarate [Symbicort -4.5 mcg Inhaler 6 gm] 2 puff 

IH Q12 12/17/18 


Amiodarone HCl [Amiodarone HCl 400 mg Tablet] 200 mg PO Q12 #30 tablet 12/19/18 


Hydrochlorothiazide [Hydrodiuril 25 mg Tablet] 25 mg PO QAM 09/24/20 


Albuterol Sulfate [Albuterol Sulfate Hfa] 2 puff IH Q6HP PRN 11/04/20 


Albuterol Sulfate [Ventolin 0.083% Neb 2.5 mg/3 mL Ampul] 1 vial IH Q8 11/04/20 


Apixaban [Eliquis 5 mg Tablet] 5 mg PO Q12 11/04/20 


Escitalopram Oxalate [Lexapro 10 mg Tablet] 10 mg PO DAILY 11/04/20 


Metoprolol Tartrate [Lopressor 50 mg Tablet] 50 mg PO Q12 11/04/20 


Tiotropium Bromide [Spiriva Respimat] 2 spray IH DAILY 11/04/20 


Prednisone [Deltasone 20 mg Tablet] 40 mg PO DAILY #10 tablet 11/13/20 


Vancomycin HCl [Vancocin Inj 500 mg Vial] 125 mg PO Q6 #8 vial 11/13/20 











History of Present Illiness


History of Present Illness: 


LUDWIN SWENSON is a 55 year old male followed by Dr. Trimble with history of 

heavy cigarette smoking and COPD with as needed use of oxygen presents now with 

increasing shortness of breath over several days associated with production of 

yellow sputum and intermittent low-grade temperature.  He denies known Covid 

exposure.  Says that he had a Covid nasal swab which was negative about 1 month 

ago when he underwent preop testing for carpal tunnel repair.  He denies chest 

pain.  He denies vomiting.  He continues to smoke about 1 pack of cigarettes per

day.  Patient has a history of paroxysmal atrial fibrillation and is on Eliquis.

 He says he is fully compliant with his medication.








Hospital Course


Hospital Course: 


He completed a course of antibiotics for his pneumonia and will complete a 

course of prednisone at home for his COPD exacerbation.  He was on high level of

oxygen support for a few days but has gradually been transitioned down back to 

the 6 L that he is on at home.  Unfortunately, he developed C. difficile 

diarrhea.  He had an excellent response to oral vancomycin and needs to take 8 

more days of that at home.  The limiting factor in getting him discharged was 

getting his oxygen level back down to what he was on at home.  He asked if we 

can get him portable oxygen tanks along with the fitting for a oxygen 

concentrator that will allow him to fill the portable tanks.  Case management 

got this arranged for him.  He will follow-up with his primary care provider in 

1 to 2 weeks.  His labs and examination were reassuring and he was discharged in

stable condition.





Physical Exam


Vital Signs: 


                                        











Temp Pulse Resp BP Pulse Ox


 


 98.7 F   65   21 H  135/77 H  93 


 


 11/13/20 15:27  11/13/20 15:27  11/13/20 15:27  11/13/20 15:27  11/13/20 15:27








                                 Intake & Output











 11/12/20 11/13/20 11/14/20





 06:59 06:59 06:59


 


Intake Total 1382 1090 590


 


Output Total 2850 2950 625


 


Balance -1468 -1860 -35


 


Weight 115.7 kg 114.8 kg 








General appearance: PRESENT: no acute distress, cooperative, disheveled, 

morbidly obese


Teeth exam: PRESENT: poor dentation


Respiratory exam: PRESENT: decreased breath sounds, prolonged expiratory phas, 

symmetrical, unlabored, wheezes - Faint end expiratory.  ABSENT: accessory 

muscle use, chest wall tenderness, crackles, rhonchi, tachypnea


Cardiovascular exam: PRESENT: RRR, +S1, +S2


Pulses: PRESENT: normal carotid pulses


Vascular exam: PRESENT: normal capillary refill


GI/Abdominal exam: PRESENT: normal bowel sounds, soft, other - Pendulous 

abdominal pannus.  ABSENT: distended, guarding, rebound, tenderness


Extremities exam: ABSENT: clubbing, pedal edema


Musculoskeletal exam: PRESENT: normal inspection.  ABSENT: deformity


Neurological exam: PRESENT: awake, oriented to person, oriented to place, 

oriented to situation


Psychiatric exam: PRESENT: flat affect


Skin exam: PRESENT: dry, warm





Results


Laboratory Results: 


                                        











WBC  19.7 10^3/uL (4.0-10.5)  H  11/08/20  04:28    


 


RBC  5.04 10^6/uL (4.35-5.55)   11/08/20  04:28    


 


Hgb  17.1 g/dL (13.5-17.0)  H  11/08/20  04:28    


 


Hct  50.1 % (37.9-51.0)   11/08/20  04:28    


 


MCV  99 fl (80-97)  H  11/08/20  04:28    


 


MCH  34.0 pg (27.0-33.4)  H  11/08/20  04:28    


 


MCHC  34.1 g/dL (32.0-36.0)   11/08/20  04:28    


 


RDW  13.6 % (11.5-14.0)   11/08/20  04:28    


 


Plt Count  297 10^3/uL (150-450)   11/08/20  04:28    


 


Lymph % (Auto)  Not Reportable   11/08/20  04:28    


 


Mono % (Auto)  Not Reportable   11/08/20  04:28    


 


Eos % (Auto)  Not Reportable   11/08/20  04:28    


 


Baso % (Auto)  Not Reportable   11/08/20  04:28    


 


Absolute Neuts (auto)  Not Reportable   11/08/20  04:28    


 


Absolute Lymphs (auto)  Not Reportable   11/08/20  04:28    


 


Absolute Monos (auto)  Not Reportable   11/08/20  04:28    


 


Absolute Eos (auto)  Not Reportable   11/08/20  04:28    


 


Absolute Basos (auto)  Not Reportable   11/08/20  04:28    


 


Total Counted  100   11/08/20  04:28    


 


Seg Neutrophils %  Not Reportable   11/08/20  04:28    


 


Seg Neuts % (Manual)  89 % (42-78)  H  11/08/20  04:28    


 


Lymphocytes % (Manual)  6 % (13-45)  L  11/08/20  04:28    


 


Monocytes % (Manual)  5 % (3-13)   11/08/20  04:28    


 


Eosinophils % (Manual)  0 % (0-6)   11/08/20  04:28    


 


Basophils % (Manual)  0 % (0-2)   11/08/20  04:28    


 


Abs Neuts (Manual)  17.5 10^3/uL (1.7-8.2)  H  11/08/20  04:28    


 


Abs Lymphs (Manual)  1.2 10^3/uL (0.5-4.7)   11/08/20  04:28    


 


Abs Monocytes (Manual)  1.0 10^3/uL (0.1-1.4)   11/08/20  04:28    


 


Absolute Eos (Manual)  0.0 10^3/uL (0.0-0.6)   11/08/20  04:28    


 


Abs Basophils (Manual)  0.0 10^3/uL (0.0-0.2)   11/08/20  04:28    


 


Toxic Granulation  1+   11/05/20  03:42    


 


Platelet Comment  ADEQUATE   11/08/20  04:28    


 


Polychromasia  SLIGHT   11/05/20  03:42    


 


Poikilocytosis  SLIGHT   11/05/20  03:42    


 


Macrocytosis  SLIGHT   11/08/20  04:28    


 


Tear Drop Cells  SLIGHT   11/05/20  03:42    


 


Ovalocytes  SLIGHT   11/05/20  03:42    


 


PT  14.8 SEC (11.4-15.4)   11/05/20  03:42    


 


INR  1.14   11/05/20  03:42    


 


APTT  35.1 SEC (23.5-35.8)   11/05/20  03:42    


 


D-Dimer  < 0.27 ug/mL (0.00-0.50)   11/05/20  19:20    


 


Carbonic Acid  1.47 mmol/L (1.05-1.35)  H  11/08/20  12:05    


 


HCO3/H2CO3 Ratio  22:1   11/08/20  12:05    


 


ABG pH  7.46  (7.35-7.45)  H  11/08/20  12:05    


 


ABG pCO2  49.0 mmHg (35-45)  H  11/08/20  12:05    


 


ABG pO2  44.8 mmHg ()  L  11/08/20  12:05    


 


ABG HCO3  33.7 mmol/L (20-24)  H  11/08/20  12:05    


 


ABG Total CO2  35.2 mmol/L (23-27)  H  11/08/20  12:05    


 


ABG O2 Saturation  82.5 % (94-98)  L  11/08/20  12:05    


 


ABG Base Excess  8.0 mmol/L  11/08/20  12:05    


 


FiO2  100%   11/08/20  12:05    


 


Sodium  135.9 mmol/L (137-145)  L  11/09/20  04:28    


 


Potassium  4.6 mmol/L (3.6-5.0)   11/09/20  04:28    


 


Chloride  93 mmol/L ()  L  11/09/20  04:28    


 


Carbon Dioxide  34 mmol/L (22-30)  H  11/09/20  04:28    


 


Anion Gap  9  (5-19)   11/09/20  04:28    


 


BUN  40 mg/dL (7-20)  H  11/09/20  04:28    


 


Creatinine  0.96 mg/dL (0.52-1.25)   11/09/20  04:28    


 


Est GFR ( Amer)  > 60  (>60)   11/09/20  04:28    


 


Est GFR (MDRD) Non-Af  > 60  (>60)   11/09/20  04:28    


 


Glucose  225 mg/dL ()  H  11/09/20  04:28    


 


Hemoglobin A1c %  5.7 % (4.7-6.0)   11/05/20  03:42    


 


Calcium  9.0 mg/dL (8.4-10.2)   11/09/20  04:28    


 


Phosphorus  4.9 mg/dL (2.5-4.5)  H  11/05/20  03:42    


 


Magnesium  2.7 mg/dL (1.6-2.3)  H  11/09/20  04:28    


 


Ferritin  437.00 ng/mL (17.9-464.0)   11/05/20  19:20    


 


Total Bilirubin  0.7 mg/dL (0.2-1.3)   11/05/20  03:42    


 


Direct Bilirubin  0.4 mg/dL (0.0-0.4)   11/05/20  03:42    


 


Neonat Total Bilirubin  Not Reportable   11/05/20  03:42    


 


Neonat Direct Bilirubin  Not Reportable   11/05/20  03:42    


 


Neonat Indirect Bili  Not Reportable   11/05/20  03:42    


 


AST  50 U/L (17-59)   11/05/20  03:42    


 


ALT  54 U/L (<50)  H  11/05/20  03:42    


 


Alkaline Phosphatase  107 U/L ()   11/05/20  03:42    


 


Lactate Dehydrogenase  391 U/L (120-246)  H  11/05/20  19:20    


 


Creatine Kinase  187 U/L ()  H  11/04/20  14:55    


 


Troponin I  < 0.012 ng/mL  11/05/20  03:42    


 


C-Reactive Protein  161.2 mg/L (<10.0)  H  11/05/20  19:20    


 


NT-Pro-B Natriuret Pep  185 pg/mL (<125)  H  11/05/20  03:42    


 


Total Protein  6.6 g/dL (6.3-8.2)   11/05/20  03:42    


 


Albumin  3.6 g/dL (3.5-5.0)   11/05/20  03:42    


 


Alpha-1-Antitrypsin  Cancelled   11/05/20  03:42    


 


Alpha-1-AT Phenotype  Cancelled   11/05/20  03:42    


 


Triglycerides  69 mg/dL (<150)   11/05/20  03:42    


 


Cholesterol  129.41 mg/dL (0-200)   11/05/20  03:42    


 


LDL Cholesterol Direct  84 mg/dL (<100)   11/05/20  03:42    


 


VLDL Cholesterol  14.0 mg/dL (10-31)   11/05/20  03:42    


 


HDL Cholesterol  34 mg/dL (>40)  L  11/05/20  03:42    


 


Amylase  47 U/L ()   11/05/20  03:42    


 


Lipase  33.1 U/L ()   11/05/20  03:42    


 


Interleukin 6  4.5 pg/mL (0.0-12.2)   11/05/20  19:20    


 


TSH  0.06 uIU/mL (0.47-4.68)  L  11/05/20  03:42    


 


Stl C. Difficile GDH Ag  POSITIVE  (NEGATIVE)   11/06/20  14:30    


 


Stl C.difficile Tox A&B  NEGATIVE  (NEGATIVE)   11/06/20  14:30    


 


Stl C.difficile Tox PCR  POSITIVE  (NEGATIVE)   11/06/20  14:30    


 


COVID-19 Source  See comment   11/04/20  15:05    


 


COVID-19 (CRYSTAL)  Not Detected  (Not Detect)   11/04/20  15:05    


 


Influenza A (Rapid)  NEGATIVE  (NEGATIVE)   11/04/20  15:04    


 


Influenza B (Rapid)  NEGATIVE  (NEGATIVE)   11/04/20  15:04    








                                        











  11/04/20 11/05/20





  14:55 03:42


 


Troponin I  < 0.012  < 0.012


 


NT-Pro-B Natriuret Pep   185 H











Impressions: 


                                        





Chest X-Ray  11/04/20 14:23


IMPRESSION:  Obstructive lung disease


Mid and lower lung alveolar and interstitial infiltrates, edema versus pneumonia


 








Chest X-Ray  11/05/20 04:00


IMPRESSION:  Obstructive lung disease.


Persistent patchy bibasilar alveolar and interstitial infiltrates, similar 

compared to yesterday


 








Chest X-Ray  11/07/20 05:00


IMPRESSION:  Severe bullous emphysema in the left lung with compressive 

atelectasis at the bases.


No change.


 














Plan


Time Spent: Greater than 30 Minutes





Stroke


Is this a Stroke Patient?: No





Acute Heart Failure


Is this a Heart Failure Patient?: No

## 2020-11-17 LAB — ALPHA-1-ANTITRYPSIN PHENOTYPE: (no result)

## 2021-01-15 ENCOUNTER — HOSPITAL ENCOUNTER (OUTPATIENT)
Dept: HOSPITAL 62 - RAD | Age: 56
End: 2021-01-15
Attending: INTERNAL MEDICINE
Payer: COMMERCIAL

## 2021-01-15 DIAGNOSIS — M84.48XD: ICD-10-CM

## 2021-01-15 DIAGNOSIS — J18.9: Primary | ICD-10-CM

## 2021-01-15 DIAGNOSIS — J43.9: ICD-10-CM

## 2021-01-15 PROCEDURE — 71046 X-RAY EXAM CHEST 2 VIEWS: CPT

## 2021-01-15 NOTE — RADIOLOGY REPORT (SQ)
EXAM DESCRIPTION:  CHEST 2 VIEWS



IMAGES COMPLETED DATE/TIME:  1/15/2021 12:38 pm



REASON FOR STUDY:  RIGHT LOWER LOBE PNEUMONIA



COMPARISON:  11/7/2020



EXAM PARAMETERS:  NUMBER OF VIEWS: two views

TECHNIQUE: Digital Frontal and Lateral radiographic views of the chest acquired.

RADIATION DOSE: NA

LIMITATIONS: none



FINDINGS:  LUNGS AND PLEURA: Unchanged severe bolus emphysema, left greater than right.  Unchanged il
l-defined right basilar opacities, likely atelectasis or scarring.  No pleural effusion or pneumothor
ax.

MEDIASTINUM AND HILAR STRUCTURES: Stable right infrahilar opacities.

HEART AND VASCULAR STRUCTURES: Stable.

BONES: No acute findings.  Multiple chronic right posterolateral rib fractures.

HARDWARE: None in the chest.

OTHER: No other significant finding.



IMPRESSION:  1.  Stable bolus emphysematous change, left greater right.

2.  Stable bibasilar opacities, possibly atelectasis or scarring although superimposed infection is n
ot entirely excluded.



TECHNICAL DOCUMENTATION:  JOB ID:  [id number]

 2011 [employer]- All Rights Reserved



Reading location - IP/workstation name: [email or web address]